# Patient Record
Sex: FEMALE | Race: BLACK OR AFRICAN AMERICAN | Employment: OTHER | URBAN - METROPOLITAN AREA
[De-identification: names, ages, dates, MRNs, and addresses within clinical notes are randomized per-mention and may not be internally consistent; named-entity substitution may affect disease eponyms.]

---

## 2017-02-22 ENCOUNTER — OFFICE VISIT (OUTPATIENT)
Dept: FAMILY MEDICINE CLINIC | Age: 62
End: 2017-02-22

## 2017-02-22 VITALS
SYSTOLIC BLOOD PRESSURE: 116 MMHG | BODY MASS INDEX: 19.86 KG/M2 | WEIGHT: 123.6 LBS | HEIGHT: 66 IN | DIASTOLIC BLOOD PRESSURE: 70 MMHG | HEART RATE: 76 BPM

## 2017-02-22 DIAGNOSIS — I10 ESSENTIAL HYPERTENSION: ICD-10-CM

## 2017-02-22 PROCEDURE — 99213 OFFICE O/P EST LOW 20 MIN: CPT | Performed by: FAMILY MEDICINE

## 2017-02-22 RX ORDER — SPIRONOLACTONE 100 MG/1
100 TABLET, FILM COATED ORAL DAILY
Qty: 90 TABLET | Refills: 1 | Status: SHIPPED | OUTPATIENT
Start: 2017-02-22 | End: 2017-08-16 | Stop reason: ALTCHOICE

## 2017-02-22 RX ORDER — VALSARTAN 160 MG/1
160 TABLET ORAL DAILY
Qty: 90 TABLET | Refills: 1 | Status: SHIPPED | OUTPATIENT
Start: 2017-02-22 | End: 2017-09-26 | Stop reason: SDUPTHER

## 2017-02-22 RX ORDER — ATENOLOL 100 MG/1
100 TABLET ORAL DAILY
Qty: 90 TABLET | Refills: 1 | Status: SHIPPED | OUTPATIENT
Start: 2017-02-22 | End: 2017-07-26 | Stop reason: SDUPTHER

## 2017-02-22 ASSESSMENT — ENCOUNTER SYMPTOMS: SHORTNESS OF BREATH: 0

## 2017-07-26 ENCOUNTER — TELEPHONE (OUTPATIENT)
Dept: FAMILY MEDICINE CLINIC | Age: 62
End: 2017-07-26

## 2017-07-26 DIAGNOSIS — I10 ESSENTIAL HYPERTENSION: ICD-10-CM

## 2017-07-26 RX ORDER — ATENOLOL 100 MG/1
100 TABLET ORAL DAILY
Qty: 30 TABLET | Refills: 1 | Status: SHIPPED | OUTPATIENT
Start: 2017-07-26 | End: 2017-09-26 | Stop reason: SDUPTHER

## 2017-07-27 DIAGNOSIS — R32 URINARY INCONTINENCE, UNSPECIFIED TYPE: ICD-10-CM

## 2017-07-27 RX ORDER — SOLIFENACIN SUCCINATE 5 MG/1
TABLET, FILM COATED ORAL
Qty: 90 TABLET | Refills: 2 | Status: SHIPPED | OUTPATIENT
Start: 2017-07-27 | End: 2018-03-03

## 2017-08-16 ENCOUNTER — OFFICE VISIT (OUTPATIENT)
Dept: FAMILY MEDICINE CLINIC | Age: 62
End: 2017-08-16

## 2017-08-16 VITALS
WEIGHT: 114.8 LBS | BODY MASS INDEX: 18.45 KG/M2 | HEART RATE: 58 BPM | DIASTOLIC BLOOD PRESSURE: 70 MMHG | SYSTOLIC BLOOD PRESSURE: 104 MMHG | HEIGHT: 66 IN

## 2017-08-16 DIAGNOSIS — Z23 NEEDS FLU SHOT: ICD-10-CM

## 2017-08-16 DIAGNOSIS — K63.5 POLYP OF COLON, UNSPECIFIED PART OF COLON, UNSPECIFIED TYPE: ICD-10-CM

## 2017-08-16 DIAGNOSIS — Z12.31 ENCOUNTER FOR SCREENING MAMMOGRAM FOR MALIGNANT NEOPLASM OF BREAST: ICD-10-CM

## 2017-08-16 DIAGNOSIS — I10 ESSENTIAL HYPERTENSION: Primary | ICD-10-CM

## 2017-08-16 DIAGNOSIS — R63.4 UNEXPLAINED WEIGHT LOSS: ICD-10-CM

## 2017-08-16 DIAGNOSIS — Z87.898 HISTORY OF IMPAIRED GLUCOSE TOLERANCE: ICD-10-CM

## 2017-08-16 DIAGNOSIS — E04.9 GOITER: ICD-10-CM

## 2017-08-16 LAB
A/G RATIO: 1.6 (ref 1.1–2.2)
ALBUMIN SERPL-MCNC: 4.4 G/DL (ref 3.4–5)
ALP BLD-CCNC: 41 U/L (ref 40–129)
ALT SERPL-CCNC: 7 U/L (ref 10–40)
ANION GAP SERPL CALCULATED.3IONS-SCNC: 11 MMOL/L (ref 3–16)
AST SERPL-CCNC: 11 U/L (ref 15–37)
BASOPHILS ABSOLUTE: 0 K/UL (ref 0–0.2)
BASOPHILS RELATIVE PERCENT: 0.5 %
BILIRUB SERPL-MCNC: <0.2 MG/DL (ref 0–1)
BUN BLDV-MCNC: 16 MG/DL (ref 7–20)
CALCIUM SERPL-MCNC: 10.2 MG/DL (ref 8.3–10.6)
CHLORIDE BLD-SCNC: 96 MMOL/L (ref 99–110)
CO2: 25 MMOL/L (ref 21–32)
CREAT SERPL-MCNC: 0.9 MG/DL (ref 0.6–1.2)
EOSINOPHILS ABSOLUTE: 0.3 K/UL (ref 0–0.6)
EOSINOPHILS RELATIVE PERCENT: 4.7 %
GFR AFRICAN AMERICAN: >60
GFR NON-AFRICAN AMERICAN: >60
GLOBULIN: 2.8 G/DL
GLUCOSE BLD-MCNC: 96 MG/DL (ref 70–99)
HCT VFR BLD CALC: 37.5 % (ref 36–48)
HEMOGLOBIN: 12.5 G/DL (ref 12–16)
LYMPHOCYTES ABSOLUTE: 1.9 K/UL (ref 1–5.1)
LYMPHOCYTES RELATIVE PERCENT: 27.6 %
MCH RBC QN AUTO: 33 PG (ref 26–34)
MCHC RBC AUTO-ENTMCNC: 33.5 G/DL (ref 31–36)
MCV RBC AUTO: 98.5 FL (ref 80–100)
MONOCYTES ABSOLUTE: 0.4 K/UL (ref 0–1.3)
MONOCYTES RELATIVE PERCENT: 5.8 %
NEUTROPHILS ABSOLUTE: 4.2 K/UL (ref 1.7–7.7)
NEUTROPHILS RELATIVE PERCENT: 61.4 %
PDW BLD-RTO: 13.5 % (ref 12.4–15.4)
PLATELET # BLD: 312 K/UL (ref 135–450)
PMV BLD AUTO: 8.9 FL (ref 5–10.5)
POTASSIUM SERPL-SCNC: 4.9 MMOL/L (ref 3.5–5.1)
RBC # BLD: 3.8 M/UL (ref 4–5.2)
SODIUM BLD-SCNC: 132 MMOL/L (ref 136–145)
TOTAL PROTEIN: 7.2 G/DL (ref 6.4–8.2)
TSH SERPL DL<=0.05 MIU/L-ACNC: 1.42 UIU/ML (ref 0.27–4.2)
WBC # BLD: 6.8 K/UL (ref 4–11)

## 2017-08-16 PROCEDURE — G0008 ADMIN INFLUENZA VIRUS VAC: HCPCS | Performed by: FAMILY MEDICINE

## 2017-08-16 PROCEDURE — 99214 OFFICE O/P EST MOD 30 MIN: CPT | Performed by: FAMILY MEDICINE

## 2017-08-16 PROCEDURE — 90686 IIV4 VACC NO PRSV 0.5 ML IM: CPT | Performed by: FAMILY MEDICINE

## 2017-08-16 PROCEDURE — 36415 COLL VENOUS BLD VENIPUNCTURE: CPT | Performed by: FAMILY MEDICINE

## 2017-08-16 ASSESSMENT — ENCOUNTER SYMPTOMS: SHORTNESS OF BREATH: 0

## 2017-08-18 DIAGNOSIS — E87.1 LOW SODIUM LEVELS: Primary | ICD-10-CM

## 2017-09-26 ENCOUNTER — TELEPHONE (OUTPATIENT)
Dept: FAMILY MEDICINE CLINIC | Age: 62
End: 2017-09-26

## 2017-09-26 DIAGNOSIS — I10 ESSENTIAL HYPERTENSION: ICD-10-CM

## 2017-09-26 DIAGNOSIS — I10 ESSENTIAL HYPERTENSION: Primary | ICD-10-CM

## 2017-09-26 RX ORDER — ATENOLOL 100 MG/1
100 TABLET ORAL DAILY
Qty: 90 TABLET | Refills: 1 | Status: SHIPPED | OUTPATIENT
Start: 2017-09-26 | End: 2018-02-22 | Stop reason: SDUPTHER

## 2017-09-26 RX ORDER — SPIRONOLACTONE 50 MG/1
50 TABLET, FILM COATED ORAL DAILY
Qty: 30 TABLET | Refills: 0 | Status: SHIPPED | OUTPATIENT
Start: 2017-09-26 | End: 2017-10-02 | Stop reason: DRUGHIGH

## 2017-09-26 RX ORDER — VALSARTAN 160 MG/1
160 TABLET ORAL DAILY
Qty: 90 TABLET | Refills: 1 | Status: SHIPPED | OUTPATIENT
Start: 2017-09-26 | End: 2018-02-22 | Stop reason: SDUPTHER

## 2017-09-26 RX ORDER — ATENOLOL 100 MG/1
100 TABLET ORAL DAILY
Qty: 30 TABLET | Refills: 0 | Status: SHIPPED | OUTPATIENT
Start: 2017-09-26 | End: 2018-02-07

## 2017-09-28 ENCOUNTER — TELEPHONE (OUTPATIENT)
Dept: FAMILY MEDICINE CLINIC | Age: 62
End: 2017-09-28

## 2017-10-02 ENCOUNTER — OFFICE VISIT (OUTPATIENT)
Dept: FAMILY MEDICINE CLINIC | Age: 62
End: 2017-10-02

## 2017-10-02 VITALS
HEIGHT: 66 IN | SYSTOLIC BLOOD PRESSURE: 110 MMHG | DIASTOLIC BLOOD PRESSURE: 70 MMHG | WEIGHT: 113.6 LBS | HEART RATE: 73 BPM | BODY MASS INDEX: 18.26 KG/M2

## 2017-10-02 DIAGNOSIS — I10 ESSENTIAL HYPERTENSION: Primary | ICD-10-CM

## 2017-10-02 PROCEDURE — 99213 OFFICE O/P EST LOW 20 MIN: CPT | Performed by: FAMILY MEDICINE

## 2017-10-02 RX ORDER — SUCRALFATE 1 G/10ML
10 SUSPENSION ORAL 2 TIMES DAILY
Refills: 1 | COMMUNITY
Start: 2017-08-29 | End: 2018-03-03

## 2017-10-02 RX ORDER — PANTOPRAZOLE SODIUM 40 MG/1
1 TABLET, DELAYED RELEASE ORAL DAILY
Refills: 3 | COMMUNITY
Start: 2017-09-29 | End: 2018-03-03

## 2017-10-02 RX ORDER — SPIRONOLACTONE 100 MG/1
100 TABLET, FILM COATED ORAL DAILY
Qty: 90 TABLET | Refills: 3 | Status: SHIPPED | OUTPATIENT
Start: 2017-10-02 | End: 2018-10-29 | Stop reason: SDUPTHER

## 2017-10-02 RX ORDER — DEXLANSOPRAZOLE 60 MG/1
1 CAPSULE, DELAYED RELEASE ORAL DAILY
Refills: 1 | COMMUNITY
Start: 2017-08-29 | End: 2018-02-07

## 2017-10-02 ASSESSMENT — ENCOUNTER SYMPTOMS: SHORTNESS OF BREATH: 0

## 2017-10-02 NOTE — PROGRESS NOTES
Subjective:      Patient ID: Kb Jean is a 57 y.o. female. Hypertension   This is a chronic problem. The current episode started more than 1 year ago. The problem is controlled. Pertinent negatives include no palpitations or shortness of breath. Risk factors for coronary artery disease include post-menopausal state. Past treatments include beta blockers, angiotensin blockers and diuretics. There are no compliance problems. Review of Systems   Constitutional: Positive for unexpected weight change. Respiratory: Negative for shortness of breath. Cardiovascular: Negative for palpitations. Objective:   Physical Exam   Constitutional: She appears well-developed and well-nourished. HENT:   Head: Normocephalic and atraumatic. Neck: Neck supple. Cardiovascular: Normal rate, regular rhythm and normal heart sounds. Pulmonary/Chest: Effort normal and breath sounds normal. No respiratory distress. She has no wheezes. Neurological: She is alert. Psychiatric: She has a normal mood and affect. Vitals:    10/02/17 1506   BP: 110/70   Site: Right Arm   Position: Sitting   Cuff Size: Medium Adult   Pulse: 73   Weight: 113 lb 9.6 oz (51.5 kg)   Height: 5' 6\" (1.676 m)     Body mass index is 18.34 kg/(m^2). Wt Readings from Last 3 Encounters:   10/02/17 113 lb 9.6 oz (51.5 kg)   08/16/17 114 lb 12.8 oz (52.1 kg)   02/22/17 123 lb 9.6 oz (56.1 kg)     BP Readings from Last 3 Encounters:   10/02/17 110/70   08/16/17 104/70   02/22/17 116/70        Assessment:      1.  Essential hypertension  spironolactone (ALDACTONE) 100 MG tablet           Plan:      HTN stable        GI is working up the weight

## 2018-01-19 PROBLEM — K29.00 ACUTE SUPERFICIAL GASTRITIS WITHOUT HEMORRHAGE: Status: ACTIVE | Noted: 2018-01-19

## 2018-02-07 ENCOUNTER — OFFICE VISIT (OUTPATIENT)
Dept: FAMILY MEDICINE CLINIC | Age: 63
End: 2018-02-07

## 2018-02-07 VITALS
RESPIRATION RATE: 15 BRPM | HEART RATE: 62 BPM | DIASTOLIC BLOOD PRESSURE: 60 MMHG | WEIGHT: 123 LBS | SYSTOLIC BLOOD PRESSURE: 95 MMHG | BODY MASS INDEX: 19.85 KG/M2

## 2018-02-07 DIAGNOSIS — I10 ESSENTIAL HYPERTENSION: Primary | ICD-10-CM

## 2018-02-07 PROCEDURE — 99213 OFFICE O/P EST LOW 20 MIN: CPT | Performed by: FAMILY MEDICINE

## 2018-02-07 PROCEDURE — G8420 CALC BMI NORM PARAMETERS: HCPCS | Performed by: FAMILY MEDICINE

## 2018-02-07 PROCEDURE — G8427 DOCREV CUR MEDS BY ELIG CLIN: HCPCS | Performed by: FAMILY MEDICINE

## 2018-02-07 PROCEDURE — 4004F PT TOBACCO SCREEN RCVD TLK: CPT | Performed by: FAMILY MEDICINE

## 2018-02-07 PROCEDURE — 3017F COLORECTAL CA SCREEN DOC REV: CPT | Performed by: FAMILY MEDICINE

## 2018-02-07 PROCEDURE — G8484 FLU IMMUNIZE NO ADMIN: HCPCS | Performed by: FAMILY MEDICINE

## 2018-02-07 PROCEDURE — 3014F SCREEN MAMMO DOC REV: CPT | Performed by: FAMILY MEDICINE

## 2018-02-07 ASSESSMENT — PATIENT HEALTH QUESTIONNAIRE - PHQ9
SUM OF ALL RESPONSES TO PHQ QUESTIONS 1-9: 1
SUM OF ALL RESPONSES TO PHQ9 QUESTIONS 1 & 2: 1
1. LITTLE INTEREST OR PLEASURE IN DOING THINGS: 1
2. FEELING DOWN, DEPRESSED OR HOPELESS: 0

## 2018-02-08 ASSESSMENT — ENCOUNTER SYMPTOMS: SHORTNESS OF BREATH: 0

## 2018-02-08 NOTE — PROGRESS NOTES
Patient ID: Josephine APPIAH 1955      Hypertension   This is a chronic problem. The current episode started more than 1 year ago. The problem is controlled. Pertinent negatives include no palpitations or shortness of breath. Risk factors for coronary artery disease include post-menopausal state. Past treatments include beta blockers, angiotensin blockers and diuretics. There are no compliance problems. Review of Systems   Respiratory: Negative for shortness of breath. Cardiovascular: Negative for palpitations. Neurological: Positive for dizziness.        Patient Active Problem List   Diagnosis    Goiter    Hearing loss    Impaired glucose tolerance    Hyperlipidemia    GERD (gastroesophageal reflux disease)    Essential hypertension    High serum renin    Serum calcium elevated    Colon polyp    Acute superficial gastritis without hemorrhage       Past Medical History:   Diagnosis Date    Colon polyp 05/2012    Dry eyes 3/2014    Dr. Dominik Taylors loss     [facility]    Hyperlipidemia 2002    Hypertension 2001    Impaired glucose tolerance 1/2013    Thyroid nodule     for bx 3/17/2016       Past Surgical History:   Procedure Laterality Date    BREAST BIOPSY      COLONOSCOPY  2012    polyp ([address])    COLONOSCOPY  09/05/2017    repeat 5 years    HYSTERECTOMY  1990's    complete (fibroids)    THYROID SURGERY      nodule    UPPER GASTROINTESTINAL ENDOSCOPY  08/29/2017    ulcer, non bleeding    UPPER GASTROINTESTINAL ENDOSCOPY  12/06/2017       Family History   Problem Relation Age of Onset    Diabetes Sister      type 2    Breast Cancer Sister     Lung Cancer Brother      Laryngeal       Current Outpatient Prescriptions on File Prior to Visit   Medication Sig Dispense Refill    CARAFATE 1 GM/10ML suspension Take 10 mLs by mouth 2 times daily  1    spironolactone (ALDACTONE) 100 MG tablet Take 1 tablet by mouth daily 90 tablet 3    valsartan

## 2018-02-12 ENCOUNTER — TELEPHONE (OUTPATIENT)
Dept: FAMILY MEDICINE CLINIC | Age: 63
End: 2018-02-12

## 2018-02-22 ENCOUNTER — OFFICE VISIT (OUTPATIENT)
Dept: FAMILY MEDICINE CLINIC | Age: 63
End: 2018-02-22

## 2018-02-22 VITALS
WEIGHT: 122 LBS | BODY MASS INDEX: 19.61 KG/M2 | HEART RATE: 88 BPM | HEIGHT: 66 IN | DIASTOLIC BLOOD PRESSURE: 74 MMHG | SYSTOLIC BLOOD PRESSURE: 130 MMHG

## 2018-02-22 DIAGNOSIS — I10 ESSENTIAL HYPERTENSION: Primary | ICD-10-CM

## 2018-02-22 DIAGNOSIS — Z12.39 SCREENING FOR BREAST CANCER: ICD-10-CM

## 2018-02-22 PROCEDURE — 3017F COLORECTAL CA SCREEN DOC REV: CPT | Performed by: FAMILY MEDICINE

## 2018-02-22 PROCEDURE — G8427 DOCREV CUR MEDS BY ELIG CLIN: HCPCS | Performed by: FAMILY MEDICINE

## 2018-02-22 PROCEDURE — G8420 CALC BMI NORM PARAMETERS: HCPCS | Performed by: FAMILY MEDICINE

## 2018-02-22 PROCEDURE — 3014F SCREEN MAMMO DOC REV: CPT | Performed by: FAMILY MEDICINE

## 2018-02-22 PROCEDURE — 99213 OFFICE O/P EST LOW 20 MIN: CPT | Performed by: FAMILY MEDICINE

## 2018-02-22 PROCEDURE — G8484 FLU IMMUNIZE NO ADMIN: HCPCS | Performed by: FAMILY MEDICINE

## 2018-02-22 PROCEDURE — 4004F PT TOBACCO SCREEN RCVD TLK: CPT | Performed by: FAMILY MEDICINE

## 2018-02-22 RX ORDER — VALSARTAN 160 MG/1
160 TABLET ORAL DAILY
Qty: 90 TABLET | Refills: 1 | Status: SHIPPED | OUTPATIENT
Start: 2018-02-22 | End: 2018-10-29

## 2018-02-22 RX ORDER — ATENOLOL 100 MG/1
100 TABLET ORAL DAILY
Qty: 90 TABLET | Refills: 1 | Status: SHIPPED | OUTPATIENT
Start: 2018-02-22 | End: 2018-10-29 | Stop reason: SDUPTHER

## 2018-02-22 RX ORDER — DIPHENHYDRAMINE HCL 25 MG
25 TABLET ORAL EVERY 6 HOURS PRN
COMMUNITY
End: 2018-03-03

## 2018-02-22 ASSESSMENT — ENCOUNTER SYMPTOMS: SHORTNESS OF BREATH: 0

## 2018-08-28 ENCOUNTER — TELEPHONE (OUTPATIENT)
Dept: FAMILY MEDICINE CLINIC | Age: 63
End: 2018-08-28

## 2018-08-28 DIAGNOSIS — I10 ESSENTIAL HYPERTENSION: Primary | ICD-10-CM

## 2018-08-28 RX ORDER — LOSARTAN POTASSIUM 50 MG/1
50 TABLET ORAL DAILY
Qty: 90 TABLET | Refills: 0 | Status: SHIPPED | OUTPATIENT
Start: 2018-08-28 | End: 2018-10-29 | Stop reason: SDUPTHER

## 2018-08-28 NOTE — TELEPHONE ENCOUNTER
Patient is in [place] visiting her sister Geronimo Officer. She needs replacement for the Valsartan that was recalled. Pharmacy is [facility].

## 2018-10-29 ENCOUNTER — OFFICE VISIT (OUTPATIENT)
Dept: FAMILY MEDICINE CLINIC | Age: 63
End: 2018-10-29
Payer: MEDICARE

## 2018-10-29 VITALS
HEART RATE: 77 BPM | BODY MASS INDEX: 18.48 KG/M2 | SYSTOLIC BLOOD PRESSURE: 118 MMHG | HEIGHT: 66 IN | WEIGHT: 115 LBS | DIASTOLIC BLOOD PRESSURE: 78 MMHG

## 2018-10-29 DIAGNOSIS — R11.0 NAUSEA: ICD-10-CM

## 2018-10-29 DIAGNOSIS — R63.4 UNINTENDED WEIGHT LOSS: ICD-10-CM

## 2018-10-29 DIAGNOSIS — Z12.31 ENCOUNTER FOR SCREENING MAMMOGRAM FOR MALIGNANT NEOPLASM OF BREAST: ICD-10-CM

## 2018-10-29 DIAGNOSIS — Z23 NEED FOR INFLUENZA VACCINATION: ICD-10-CM

## 2018-10-29 DIAGNOSIS — I10 ESSENTIAL HYPERTENSION: Primary | ICD-10-CM

## 2018-10-29 DIAGNOSIS — R32 URINARY INCONTINENCE, UNSPECIFIED TYPE: ICD-10-CM

## 2018-10-29 DIAGNOSIS — H91.90 HEARING LOSS, UNSPECIFIED HEARING LOSS TYPE, UNSPECIFIED LATERALITY: ICD-10-CM

## 2018-10-29 DIAGNOSIS — R63.0 LOSS OF APPETITE: ICD-10-CM

## 2018-10-29 DIAGNOSIS — Z78.0 POST-MENOPAUSAL: ICD-10-CM

## 2018-10-29 LAB
ANION GAP SERPL CALCULATED.3IONS-SCNC: 19 MMOL/L (ref 3–16)
BACTERIA: ABNORMAL /HPF
BILIRUBIN URINE: ABNORMAL
BLOOD, URINE: ABNORMAL
BUN BLDV-MCNC: 7 MG/DL (ref 7–20)
CALCIUM SERPL-MCNC: 10.3 MG/DL (ref 8.3–10.6)
CHLORIDE BLD-SCNC: 105 MMOL/L (ref 99–110)
CLARITY: ABNORMAL
CO2: 21 MMOL/L (ref 21–32)
COLOR: ABNORMAL
COMMENT UA: ABNORMAL
CREAT SERPL-MCNC: 0.7 MG/DL (ref 0.6–1.2)
CRYSTALS, UA: ABNORMAL /HPF
EPITHELIAL CELLS, UA: 5 /HPF (ref 0–5)
GFR AFRICAN AMERICAN: >60
GFR NON-AFRICAN AMERICAN: >60
GLUCOSE BLD-MCNC: 100 MG/DL (ref 70–99)
GLUCOSE URINE: NEGATIVE MG/DL
HCT VFR BLD CALC: 43.2 % (ref 36–48)
HEMOGLOBIN: 14.5 G/DL (ref 12–16)
KETONES, URINE: ABNORMAL MG/DL
LEUKOCYTE ESTERASE, URINE: NEGATIVE
MCH RBC QN AUTO: 34.1 PG (ref 26–34)
MCHC RBC AUTO-ENTMCNC: 33.5 G/DL (ref 31–36)
MCV RBC AUTO: 101.7 FL (ref 80–100)
MICROSCOPIC EXAMINATION: YES
NITRITE, URINE: NEGATIVE
PDW BLD-RTO: 14.1 % (ref 12.4–15.4)
PH UA: 6
PLATELET # BLD: 266 K/UL (ref 135–450)
PMV BLD AUTO: 9 FL (ref 5–10.5)
POTASSIUM SERPL-SCNC: 4.9 MMOL/L (ref 3.5–5.1)
PROTEIN UA: 30 MG/DL
RBC # BLD: 4.25 M/UL (ref 4–5.2)
RBC UA: 9 /HPF (ref 0–4)
SODIUM BLD-SCNC: 145 MMOL/L (ref 136–145)
SPECIFIC GRAVITY UA: 1.02
TSH SERPL DL<=0.05 MIU/L-ACNC: 0.86 UIU/ML (ref 0.27–4.2)
URINE TYPE: ABNORMAL
UROBILINOGEN, URINE: 0.2 E.U./DL
WBC # BLD: 6.1 K/UL (ref 4–11)
WBC UA: 2 /HPF (ref 0–5)

## 2018-10-29 PROCEDURE — 81003 URINALYSIS AUTO W/O SCOPE: CPT | Performed by: FAMILY MEDICINE

## 2018-10-29 PROCEDURE — 90662 IIV NO PRSV INCREASED AG IM: CPT | Performed by: FAMILY MEDICINE

## 2018-10-29 PROCEDURE — G8400 PT W/DXA NO RESULTS DOC: HCPCS | Performed by: FAMILY MEDICINE

## 2018-10-29 PROCEDURE — G0008 ADMIN INFLUENZA VIRUS VAC: HCPCS | Performed by: FAMILY MEDICINE

## 2018-10-29 PROCEDURE — 99214 OFFICE O/P EST MOD 30 MIN: CPT | Performed by: FAMILY MEDICINE

## 2018-10-29 PROCEDURE — G8427 DOCREV CUR MEDS BY ELIG CLIN: HCPCS | Performed by: FAMILY MEDICINE

## 2018-10-29 PROCEDURE — 1101F PT FALLS ASSESS-DOCD LE1/YR: CPT | Performed by: FAMILY MEDICINE

## 2018-10-29 PROCEDURE — 1123F ACP DISCUSS/DSCN MKR DOCD: CPT | Performed by: FAMILY MEDICINE

## 2018-10-29 PROCEDURE — 0509F URINE INCON PLAN DOCD: CPT | Performed by: FAMILY MEDICINE

## 2018-10-29 PROCEDURE — G8420 CALC BMI NORM PARAMETERS: HCPCS | Performed by: FAMILY MEDICINE

## 2018-10-29 PROCEDURE — 4004F PT TOBACCO SCREEN RCVD TLK: CPT | Performed by: FAMILY MEDICINE

## 2018-10-29 PROCEDURE — 36415 COLL VENOUS BLD VENIPUNCTURE: CPT | Performed by: FAMILY MEDICINE

## 2018-10-29 PROCEDURE — 1090F PRES/ABSN URINE INCON ASSESS: CPT | Performed by: FAMILY MEDICINE

## 2018-10-29 PROCEDURE — 3017F COLORECTAL CA SCREEN DOC REV: CPT | Performed by: FAMILY MEDICINE

## 2018-10-29 PROCEDURE — 4040F PNEUMOC VAC/ADMIN/RCVD: CPT | Performed by: FAMILY MEDICINE

## 2018-10-29 PROCEDURE — G8482 FLU IMMUNIZE ORDER/ADMIN: HCPCS | Performed by: FAMILY MEDICINE

## 2018-10-29 RX ORDER — LOSARTAN POTASSIUM 50 MG/1
50 TABLET ORAL DAILY
Qty: 90 TABLET | Refills: 1 | Status: SHIPPED | OUTPATIENT
Start: 2018-10-29 | End: 2018-12-26 | Stop reason: SDUPTHER

## 2018-10-29 RX ORDER — ATENOLOL 100 MG/1
100 TABLET ORAL DAILY
Qty: 90 TABLET | Refills: 1 | Status: SHIPPED | OUTPATIENT
Start: 2018-10-29 | End: 2019-04-12 | Stop reason: SDUPTHER

## 2018-10-29 RX ORDER — ONDANSETRON 4 MG/1
4 TABLET, FILM COATED ORAL 3 TIMES DAILY PRN
Qty: 40 TABLET | Refills: 1 | Status: SHIPPED | OUTPATIENT
Start: 2018-10-29 | End: 2018-11-12 | Stop reason: SDUPTHER

## 2018-10-29 RX ORDER — VALSARTAN 160 MG/1
160 TABLET ORAL DAILY
Qty: 90 TABLET | Refills: 1 | Status: SHIPPED | OUTPATIENT
Start: 2018-10-29 | End: 2018-10-29 | Stop reason: CLARIF

## 2018-10-29 RX ORDER — SPIRONOLACTONE 100 MG/1
100 TABLET, FILM COATED ORAL DAILY
Qty: 90 TABLET | Refills: 3 | Status: SHIPPED | OUTPATIENT
Start: 2018-10-29 | End: 2018-11-12 | Stop reason: ALTCHOICE

## 2018-10-29 RX ORDER — LOSARTAN POTASSIUM 50 MG/1
50 TABLET ORAL DAILY
Qty: 90 TABLET | Refills: 1 | Status: SHIPPED | OUTPATIENT
Start: 2018-10-29 | End: 2018-11-12

## 2018-10-29 RX ORDER — SOLIFENACIN SUCCINATE 5 MG/1
5 TABLET, FILM COATED ORAL DAILY
Qty: 90 TABLET | Refills: 1 | Status: SHIPPED | OUTPATIENT
Start: 2018-10-29 | End: 2019-05-01 | Stop reason: SDUPTHER

## 2018-10-29 RX ORDER — PANTOPRAZOLE SODIUM 40 MG/1
40 TABLET, DELAYED RELEASE ORAL DAILY
Qty: 30 TABLET | Refills: 0 | Status: SHIPPED | OUTPATIENT
Start: 2018-10-29 | End: 2018-11-12 | Stop reason: SDUPTHER

## 2018-10-29 ASSESSMENT — ENCOUNTER SYMPTOMS
VOMITING: 0
SHORTNESS OF BREATH: 0
NAUSEA: 1

## 2018-10-29 NOTE — PROGRESS NOTES
Patient ID: Sandra APPIAH 1955    . Hypertension   This is a chronic problem. The current episode started more than 1 year ago. The problem is controlled. Pertinent negatives include no palpitations or shortness of breath. Risk factors for coronary artery disease include post-menopausal state. Past treatments include beta blockers, angiotensin blockers and diuretics (preferred to take the 100mg Beta blocker because the half dose made her dizzy). There are no compliance problems. Nausea: on and off. Poor appetite. Gets full easily. Is  from her --was out of town for a while and now back. Symptoms have worsened since she is back because she is in the same home with her soon to be ex . Was on medication in the past that helped but can't recall the name. Review of Systems   Constitutional: Positive for unexpected weight change. Respiratory: Negative for shortness of breath. Cardiovascular: Negative for palpitations. Gastrointestinal: Positive for nausea. Negative for vomiting. Psychiatric/Behavioral: Positive for agitation and behavioral problems.        Patient Active Problem List   Diagnosis    Goiter    Hearing loss    Impaired glucose tolerance    Hyperlipidemia    GERD (gastroesophageal reflux disease)    Essential hypertension    High serum renin    Serum calcium elevated    Colon polyp    Acute superficial gastritis without hemorrhage       Past Medical History:   Diagnosis Date    Colon polyp 05/2012    Dry eyes 3/2014    Dr. Chin Puls loss     [place]    Hyperlipidemia 2002    Hypertension 2001    Impaired glucose tolerance 1/2013    Thyroid nodule     for bx 3/17/2016       Past Surgical History:   Procedure Laterality Date    BREAST BIOPSY      COLONOSCOPY  2012    polyp ([address])    COLONOSCOPY  09/05/2017    repeat 5 years    HYSTERECTOMY  1990's    complete (fibroids)    THYROID SURGERY tablet    pantoprazole (PROTONIX) 40 MG tablet   7. Hearing loss, unspecified hearing loss type, unspecified laterality     8. Encounter for screening mammogram for malignant neoplasm of breast  YAYO Screening Bilateral   9. Post-menopausal  DEXA BONE DENSITY AXIAL SKELETON           Plan:      HTN controlled continue with current medications. Diovan stopped and replaced with Losartan a few months ago due to national recall. Review of GI medications indicates that she was on Zofran in the past.  She agrees that this is the medication that helped her. Communication limited because patient hard of hearing and she is not always looking at me when I'm speaking. When she looks, she is able to read my lips. Has had extensive GI workup in the past, so not sure if additional work up is warranted.   She has already been referred t ENT for her hearing diffictulty

## 2018-10-30 LAB
CHOLESTEROL, TOTAL: 185 MG/DL (ref 0–199)
HDLC SERPL-MCNC: 47 MG/DL (ref 40–60)
LDL CHOLESTEROL CALCULATED: 118 MG/DL
TRIGL SERPL-MCNC: 99 MG/DL (ref 0–150)
VLDLC SERPL CALC-MCNC: 20 MG/DL

## 2018-10-31 ENCOUNTER — TELEPHONE (OUTPATIENT)
Dept: FAMILY MEDICINE CLINIC | Age: 63
End: 2018-10-31

## 2018-10-31 DIAGNOSIS — I10 ESSENTIAL HYPERTENSION: Primary | ICD-10-CM

## 2018-10-31 RX ORDER — ATENOLOL 100 MG/1
100 TABLET ORAL DAILY
Qty: 30 TABLET | Refills: 0 | Status: SHIPPED | OUTPATIENT
Start: 2018-10-31 | End: 2018-11-12

## 2018-11-08 ENCOUNTER — HOSPITAL ENCOUNTER (OUTPATIENT)
Dept: WOMENS IMAGING | Age: 63
Discharge: HOME | End: 2018-11-08
Payer: MEDICARE

## 2018-11-08 ENCOUNTER — TELEPHONE (OUTPATIENT)
Dept: FAMILY MEDICINE CLINIC | Age: 63
End: 2018-11-08

## 2018-11-08 DIAGNOSIS — Z78.0 POST-MENOPAUSAL: ICD-10-CM

## 2018-11-08 DIAGNOSIS — Z12.39 SCREENING FOR BREAST CANCER: ICD-10-CM

## 2018-11-08 DIAGNOSIS — I10 ESSENTIAL HYPERTENSION: Primary | ICD-10-CM

## 2018-11-08 PROCEDURE — 77067 SCR MAMMO BI INCL CAD: CPT

## 2018-11-08 PROCEDURE — 77080 DXA BONE DENSITY AXIAL: CPT

## 2018-11-08 RX ORDER — SPIRONOLACTONE 50 MG/1
100 TABLET, FILM COATED ORAL DAILY
Qty: 180 TABLET | Refills: 3 | Status: SHIPPED | OUTPATIENT
Start: 2018-11-08 | End: 2019-08-12 | Stop reason: SDUPTHER

## 2018-11-12 ENCOUNTER — OFFICE VISIT (OUTPATIENT)
Dept: FAMILY MEDICINE CLINIC | Age: 63
End: 2018-11-12
Payer: MEDICARE

## 2018-11-12 VITALS
HEIGHT: 66 IN | DIASTOLIC BLOOD PRESSURE: 70 MMHG | BODY MASS INDEX: 18.61 KG/M2 | SYSTOLIC BLOOD PRESSURE: 110 MMHG | HEART RATE: 71 BPM | WEIGHT: 115.8 LBS

## 2018-11-12 DIAGNOSIS — M81.0 AGE-RELATED OSTEOPOROSIS WITHOUT CURRENT PATHOLOGICAL FRACTURE: ICD-10-CM

## 2018-11-12 DIAGNOSIS — R63.0 LOSS OF APPETITE: ICD-10-CM

## 2018-11-12 DIAGNOSIS — R63.4 UNINTENDED WEIGHT LOSS: ICD-10-CM

## 2018-11-12 DIAGNOSIS — R11.0 NAUSEA: Primary | ICD-10-CM

## 2018-11-12 PROCEDURE — G8482 FLU IMMUNIZE ORDER/ADMIN: HCPCS | Performed by: FAMILY MEDICINE

## 2018-11-12 PROCEDURE — 1090F PRES/ABSN URINE INCON ASSESS: CPT | Performed by: FAMILY MEDICINE

## 2018-11-12 PROCEDURE — 4040F PNEUMOC VAC/ADMIN/RCVD: CPT | Performed by: FAMILY MEDICINE

## 2018-11-12 PROCEDURE — 1101F PT FALLS ASSESS-DOCD LE1/YR: CPT | Performed by: FAMILY MEDICINE

## 2018-11-12 PROCEDURE — G8420 CALC BMI NORM PARAMETERS: HCPCS | Performed by: FAMILY MEDICINE

## 2018-11-12 PROCEDURE — 1123F ACP DISCUSS/DSCN MKR DOCD: CPT | Performed by: FAMILY MEDICINE

## 2018-11-12 PROCEDURE — 3017F COLORECTAL CA SCREEN DOC REV: CPT | Performed by: FAMILY MEDICINE

## 2018-11-12 PROCEDURE — 4004F PT TOBACCO SCREEN RCVD TLK: CPT | Performed by: FAMILY MEDICINE

## 2018-11-12 PROCEDURE — G8427 DOCREV CUR MEDS BY ELIG CLIN: HCPCS | Performed by: FAMILY MEDICINE

## 2018-11-12 PROCEDURE — 99213 OFFICE O/P EST LOW 20 MIN: CPT | Performed by: FAMILY MEDICINE

## 2018-11-12 PROCEDURE — G8399 PT W/DXA RESULTS DOCUMENT: HCPCS | Performed by: FAMILY MEDICINE

## 2018-11-12 RX ORDER — ONDANSETRON 4 MG/1
4 TABLET, FILM COATED ORAL DAILY PRN
Qty: 90 TABLET | Refills: 1 | Status: SHIPPED | OUTPATIENT
Start: 2018-11-12 | End: 2019-08-12

## 2018-11-12 RX ORDER — PANTOPRAZOLE SODIUM 40 MG/1
40 TABLET, DELAYED RELEASE ORAL DAILY
Qty: 90 TABLET | Refills: 3 | Status: SHIPPED | OUTPATIENT
Start: 2018-11-12 | End: 2019-08-12

## 2018-11-12 NOTE — PROGRESS NOTES
Patient ID: Satya Madison 1955        HPI here for follow-up of nausea,  abdominal pain. Feels like she is doing a lot better with the current medication. She's taken the Protonix daily. Zofran she uses every once in a while. Is pleased with how she is feeling    Review of Systems   Constitutional: Negative for appetite change and unexpected weight change. Gastrointestinal: Positive for nausea. Negative for vomiting.        Patient Active Problem List   Diagnosis    Goiter    Hearing loss    Impaired glucose tolerance    Hyperlipidemia    GERD (gastroesophageal reflux disease)    Essential hypertension    High serum renin    Serum calcium elevated    Colon polyp    Acute superficial gastritis without hemorrhage       Past Medical History:   Diagnosis Date    Colon polyp 05/2012    Dry eyes 3/2014    Dr. Evangelista Estimable loss     [facility]    Hyperlipidemia 2002    Hypertension 2001    Impaired glucose tolerance 1/2013    Thyroid nodule     for bx 3/17/2016       Past Surgical History:   Procedure Laterality Date    BREAST BIOPSY      COLONOSCOPY  2012    polyp ([address])    COLONOSCOPY  09/05/2017    repeat 5 years    HYSTERECTOMY  1990's    complete (fibroids)    THYROID SURGERY      nodule    UPPER GASTROINTESTINAL ENDOSCOPY  08/29/2017    ulcer, non bleeding    UPPER GASTROINTESTINAL ENDOSCOPY  12/06/2017       Family History   Problem Relation Age of Onset    Diabetes Sister         type 2    Breast Cancer Sister     Lung Cancer Brother         Laryngeal       Current Outpatient Prescriptions on File Prior to Visit   Medication Sig Dispense Refill    spironolactone (ALDACTONE) 50 MG tablet Take 2 tablets by mouth daily 180 tablet 3    losartan (COZAAR) 50 MG tablet Take 1 tablet by mouth daily 90 tablet 1    atenolol (TENORMIN) 100 MG tablet Take 1 tablet by mouth daily 90 tablet 1    solifenacin (VESICARE) 5 MG tablet Take 1 tablet by mouth

## 2018-11-13 ENCOUNTER — PATIENT MESSAGE (OUTPATIENT)
Dept: FAMILY MEDICINE CLINIC | Age: 63
End: 2018-11-13

## 2018-11-13 PROBLEM — M81.0 AGE-RELATED OSTEOPOROSIS WITHOUT CURRENT PATHOLOGICAL FRACTURE: Status: ACTIVE | Noted: 2018-11-13

## 2018-11-13 ASSESSMENT — ENCOUNTER SYMPTOMS
VOMITING: 0
NAUSEA: 1

## 2018-11-13 NOTE — LETTER
[facility]  [address]  Phone: [phone]  Fax: [phone]    Rohit Price MD        November 28, 2018    [address]  [address] [place] [id number]      Dear Mikayla Emery: The office has been unable to contact you with the results through my chart. The results are as follows;I reviewed your bone density again.  You actually have osteoporosis which means that your bones are very thin.  More thin than what I told you.  I recommend that in addition to the Caltrate D that you take another medication to help to stop the thinning of the bones.  Is it OK to send this to your mail away pharmacy? Enclosed please find your My chart user ID and a my chart ID wallet card. Please record your user ID and password. If you have forgotten your password, please call the office at [phone] and we can set up a temporary one. Please don't forget that most test results  are released to  [employer] for viewing. If you have any questions or concerns, please don't hesitate to call.     Sincerely,        Rohit Price MD

## 2018-11-20 ENCOUNTER — TELEPHONE (OUTPATIENT)
Dept: FAMILY MEDICINE CLINIC | Age: 63
End: 2018-11-20

## 2018-11-20 DIAGNOSIS — I10 ESSENTIAL HYPERTENSION: Primary | ICD-10-CM

## 2018-11-20 RX ORDER — LOSARTAN POTASSIUM 50 MG/1
50 TABLET ORAL DAILY
Qty: 30 TABLET | Refills: 0 | Status: SHIPPED | OUTPATIENT
Start: 2018-11-20 | End: 2019-02-18

## 2018-11-20 NOTE — TELEPHONE ENCOUNTER
Patient is going out of town tomorrow morning. [facility]. Can ten days be called to the [facility] on [address]. Will call [facility] after to fill.

## 2018-12-26 DIAGNOSIS — I10 ESSENTIAL HYPERTENSION: ICD-10-CM

## 2018-12-26 RX ORDER — LOSARTAN POTASSIUM 50 MG/1
50 TABLET ORAL DAILY
Qty: 90 TABLET | Refills: 1 | Status: SHIPPED | OUTPATIENT
Start: 2018-12-26 | End: 2019-05-30 | Stop reason: SDUPTHER

## 2019-01-18 ENCOUNTER — TELEPHONE (OUTPATIENT)
Dept: FAMILY MEDICINE CLINIC | Age: 64
End: 2019-01-18

## 2019-02-18 ENCOUNTER — OFFICE VISIT (OUTPATIENT)
Dept: FAMILY MEDICINE CLINIC | Age: 64
End: 2019-02-18
Payer: MEDICARE

## 2019-02-18 VITALS
SYSTOLIC BLOOD PRESSURE: 160 MMHG | WEIGHT: 115.8 LBS | DIASTOLIC BLOOD PRESSURE: 90 MMHG | BODY MASS INDEX: 18.61 KG/M2 | HEIGHT: 66 IN | HEART RATE: 76 BPM

## 2019-02-18 DIAGNOSIS — R73.02 IMPAIRED GLUCOSE TOLERANCE: ICD-10-CM

## 2019-02-18 DIAGNOSIS — R62.7 FAILURE TO GAIN WEIGHT IN ADULT: Primary | ICD-10-CM

## 2019-02-18 DIAGNOSIS — R63.6 UNDERWEIGHT: ICD-10-CM

## 2019-02-18 DIAGNOSIS — I10 ESSENTIAL HYPERTENSION: ICD-10-CM

## 2019-02-18 DIAGNOSIS — E78.5 HYPERLIPIDEMIA, UNSPECIFIED HYPERLIPIDEMIA TYPE: ICD-10-CM

## 2019-02-18 DIAGNOSIS — I10 UNCONTROLLED HYPERTENSION: ICD-10-CM

## 2019-02-18 PROBLEM — K44.9 HIATAL HERNIA: Status: ACTIVE | Noted: 2019-02-18

## 2019-02-18 LAB
A/G RATIO: 1.6 (ref 1.1–2.2)
ALBUMIN SERPL-MCNC: 5.1 G/DL (ref 3.4–5)
ALP BLD-CCNC: 60 U/L (ref 40–129)
ALT SERPL-CCNC: 8 U/L (ref 10–40)
ANION GAP SERPL CALCULATED.3IONS-SCNC: 14 MMOL/L (ref 3–16)
AST SERPL-CCNC: 14 U/L (ref 15–37)
BASOPHILS ABSOLUTE: 0 K/UL (ref 0–0.2)
BASOPHILS RELATIVE PERCENT: 0.5 %
BILIRUB SERPL-MCNC: 0.4 MG/DL (ref 0–1)
BUN BLDV-MCNC: 9 MG/DL (ref 7–20)
CALCIUM SERPL-MCNC: 11.3 MG/DL (ref 8.3–10.6)
CHLORIDE BLD-SCNC: 96 MMOL/L (ref 99–110)
CO2: 26 MMOL/L (ref 21–32)
CREAT SERPL-MCNC: 0.8 MG/DL (ref 0.6–1.2)
EOSINOPHILS ABSOLUTE: 0.1 K/UL (ref 0–0.6)
EOSINOPHILS RELATIVE PERCENT: 1 %
FOLATE: >20 NG/ML (ref 4.78–24.2)
GFR AFRICAN AMERICAN: >60
GFR NON-AFRICAN AMERICAN: >60
GLOBULIN: 3.2 G/DL
GLUCOSE BLD-MCNC: 102 MG/DL (ref 70–99)
HCT VFR BLD CALC: 42.3 % (ref 36–48)
HEMOGLOBIN: 14.1 G/DL (ref 12–16)
LYMPHOCYTES ABSOLUTE: 1.7 K/UL (ref 1–5.1)
LYMPHOCYTES RELATIVE PERCENT: 28.6 %
MCH RBC QN AUTO: 33 PG (ref 26–34)
MCHC RBC AUTO-ENTMCNC: 33.4 G/DL (ref 31–36)
MCV RBC AUTO: 98.7 FL (ref 80–100)
MONOCYTES ABSOLUTE: 0.4 K/UL (ref 0–1.3)
MONOCYTES RELATIVE PERCENT: 7.5 %
NEUTROPHILS ABSOLUTE: 3.6 K/UL (ref 1.7–7.7)
NEUTROPHILS RELATIVE PERCENT: 62.4 %
PDW BLD-RTO: 14.3 % (ref 12.4–15.4)
PLATELET # BLD: 291 K/UL (ref 135–450)
PMV BLD AUTO: 9.5 FL (ref 5–10.5)
POTASSIUM SERPL-SCNC: 4.1 MMOL/L (ref 3.5–5.1)
RBC # BLD: 4.28 M/UL (ref 4–5.2)
SEDIMENTATION RATE, ERYTHROCYTE: 19 MM/HR (ref 0–30)
SODIUM BLD-SCNC: 136 MMOL/L (ref 136–145)
TOTAL PROTEIN: 8.3 G/DL (ref 6.4–8.2)
VITAMIN B-12: 615 PG/ML (ref 211–911)
WBC # BLD: 5.8 K/UL (ref 4–11)

## 2019-02-18 PROCEDURE — G8482 FLU IMMUNIZE ORDER/ADMIN: HCPCS | Performed by: FAMILY MEDICINE

## 2019-02-18 PROCEDURE — 1036F TOBACCO NON-USER: CPT | Performed by: FAMILY MEDICINE

## 2019-02-18 PROCEDURE — 99214 OFFICE O/P EST MOD 30 MIN: CPT | Performed by: FAMILY MEDICINE

## 2019-02-18 PROCEDURE — 4040F PNEUMOC VAC/ADMIN/RCVD: CPT | Performed by: FAMILY MEDICINE

## 2019-02-18 PROCEDURE — 3017F COLORECTAL CA SCREEN DOC REV: CPT | Performed by: FAMILY MEDICINE

## 2019-02-18 PROCEDURE — 1123F ACP DISCUSS/DSCN MKR DOCD: CPT | Performed by: FAMILY MEDICINE

## 2019-02-18 PROCEDURE — G8399 PT W/DXA RESULTS DOCUMENT: HCPCS | Performed by: FAMILY MEDICINE

## 2019-02-18 PROCEDURE — 1090F PRES/ABSN URINE INCON ASSESS: CPT | Performed by: FAMILY MEDICINE

## 2019-02-18 PROCEDURE — 36415 COLL VENOUS BLD VENIPUNCTURE: CPT | Performed by: FAMILY MEDICINE

## 2019-02-18 PROCEDURE — G8427 DOCREV CUR MEDS BY ELIG CLIN: HCPCS | Performed by: FAMILY MEDICINE

## 2019-02-18 PROCEDURE — 1101F PT FALLS ASSESS-DOCD LE1/YR: CPT | Performed by: FAMILY MEDICINE

## 2019-02-18 PROCEDURE — G8420 CALC BMI NORM PARAMETERS: HCPCS | Performed by: FAMILY MEDICINE

## 2019-02-18 ASSESSMENT — PATIENT HEALTH QUESTIONNAIRE - PHQ9
SUM OF ALL RESPONSES TO PHQ QUESTIONS 1-9: 2
2. FEELING DOWN, DEPRESSED OR HOPELESS: 1
SUM OF ALL RESPONSES TO PHQ9 QUESTIONS 1 & 2: 2
SUM OF ALL RESPONSES TO PHQ QUESTIONS 1-9: 2
1. LITTLE INTEREST OR PLEASURE IN DOING THINGS: 1

## 2019-02-18 ASSESSMENT — ENCOUNTER SYMPTOMS
CONSTIPATION: 0
ABDOMINAL PAIN: 0
BLOOD IN STOOL: 0

## 2019-02-19 LAB
ESTIMATED AVERAGE GLUCOSE: 111.2 MG/DL
HBA1C MFR BLD: 5.5 %

## 2019-04-12 ENCOUNTER — OFFICE VISIT (OUTPATIENT)
Dept: FAMILY MEDICINE CLINIC | Age: 64
End: 2019-04-12
Payer: MEDICARE

## 2019-04-12 VITALS
HEART RATE: 84 BPM | HEIGHT: 66 IN | BODY MASS INDEX: 19.06 KG/M2 | DIASTOLIC BLOOD PRESSURE: 88 MMHG | WEIGHT: 118.6 LBS | SYSTOLIC BLOOD PRESSURE: 120 MMHG

## 2019-04-12 DIAGNOSIS — E83.52 SERUM CALCIUM ELEVATED: ICD-10-CM

## 2019-04-12 DIAGNOSIS — I10 ESSENTIAL HYPERTENSION: Primary | ICD-10-CM

## 2019-04-12 PROCEDURE — G8399 PT W/DXA RESULTS DOCUMENT: HCPCS | Performed by: FAMILY MEDICINE

## 2019-04-12 PROCEDURE — 4004F PT TOBACCO SCREEN RCVD TLK: CPT | Performed by: FAMILY MEDICINE

## 2019-04-12 PROCEDURE — G8427 DOCREV CUR MEDS BY ELIG CLIN: HCPCS | Performed by: FAMILY MEDICINE

## 2019-04-12 PROCEDURE — 3017F COLORECTAL CA SCREEN DOC REV: CPT | Performed by: FAMILY MEDICINE

## 2019-04-12 PROCEDURE — 1123F ACP DISCUSS/DSCN MKR DOCD: CPT | Performed by: FAMILY MEDICINE

## 2019-04-12 PROCEDURE — 4040F PNEUMOC VAC/ADMIN/RCVD: CPT | Performed by: FAMILY MEDICINE

## 2019-04-12 PROCEDURE — 1090F PRES/ABSN URINE INCON ASSESS: CPT | Performed by: FAMILY MEDICINE

## 2019-04-12 PROCEDURE — 99213 OFFICE O/P EST LOW 20 MIN: CPT | Performed by: FAMILY MEDICINE

## 2019-04-12 PROCEDURE — G8420 CALC BMI NORM PARAMETERS: HCPCS | Performed by: FAMILY MEDICINE

## 2019-04-12 RX ORDER — ATENOLOL 100 MG/1
100 TABLET ORAL DAILY
Qty: 90 TABLET | Refills: 1 | Status: SHIPPED | OUTPATIENT
Start: 2019-04-12 | End: 2019-08-12 | Stop reason: SDUPTHER

## 2019-04-12 ASSESSMENT — ENCOUNTER SYMPTOMS: SHORTNESS OF BREATH: 0

## 2019-04-12 NOTE — PROGRESS NOTES
Refill    losartan (COZAAR) 50 MG tablet Take 1 tablet by mouth daily 90 tablet 1    Calcium Carb-Cholecalciferol (CALTRATE 600+D) 600-800 MG-UNIT TABS Take twice per day 180 tablet 3    spironolactone (ALDACTONE) 50 MG tablet Take 2 tablets by mouth daily 180 tablet 3    aspirin 81 MG chewable tablet Take 81 mg by mouth daily.  Probiotic Product (PROBIOTIC DAILY PO) Take 1 tablet by mouth daily      pantoprazole (PROTONIX) 40 MG tablet Take 1 tablet by mouth daily 90 tablet 3    ondansetron (ZOFRAN) 4 MG tablet Take 1 tablet by mouth daily as needed for Nausea or Vomiting 90 tablet 1    solifenacin (VESICARE) 5 MG tablet Take 1 tablet by mouth daily 90 tablet 1    [DISCONTINUED] solifenacin (VESICARE) 5 MG tablet Take 1 tablet by mouth daily. 90 tablet 1     No current facility-administered medications on file prior to visit. Objective:     Physical Exam   Constitutional: She appears well-developed and well-nourished. HENT:   Head: Normocephalic and atraumatic. Right Ear: Decreased hearing is noted. Left Ear: Decreased hearing is noted. Neck: Neck supple. Cardiovascular: Normal rate, regular rhythm, S1 normal, S2 normal and normal heart sounds. Pulmonary/Chest: Effort normal and breath sounds normal. No respiratory distress. She has no wheezes. Neurological: She is alert. Skin: Skin is warm, dry and intact. Psychiatric: She has a normal mood and affect. Nursing note and vitals reviewed. Vitals:    04/12/19 1003   BP: 120/88   Site: Left Upper Arm   Position: Sitting   Cuff Size: Medium Adult   Pulse: 84   Weight: 118 lb 9.6 oz (53.8 kg)   Height: 5' 6\" (1.676 m)     Body mass index is 19.14 kg/m².      Wt Readings from Last 3 Encounters:   04/12/19 118 lb 9.6 oz (53.8 kg)   02/18/19 115 lb 12.8 oz (52.5 kg)   11/12/18 115 lb 12.8 oz (52.5 kg)     BP Readings from Last 3 Encounters:   04/12/19 120/88   02/18/19 (!) 160/90   11/12/18 110/70          No results found for this visit on 04/12/19. Assessment:       Diagnosis Orders   1. Essential hypertension  atenolol (TENORMIN) 100 MG tablet   2. Serum calcium elevated             Plan:      Explained that patient needs to stop taking her calcium supplement. Her serum calcium level was elevated previously. I tried to get a repeat blood test today, but she loudly protested. She does not want to be stuck again today. We'll recheck next time.

## 2019-05-01 DIAGNOSIS — R32 URINARY INCONTINENCE, UNSPECIFIED TYPE: ICD-10-CM

## 2019-05-01 RX ORDER — SOLIFENACIN SUCCINATE 5 MG/1
5 TABLET, FILM COATED ORAL DAILY
Qty: 90 TABLET | Refills: 1 | Status: SHIPPED | OUTPATIENT
Start: 2019-05-01 | End: 2019-08-12 | Stop reason: SDUPTHER

## 2019-05-30 DIAGNOSIS — I10 ESSENTIAL HYPERTENSION: ICD-10-CM

## 2019-05-30 RX ORDER — LOSARTAN POTASSIUM 50 MG/1
50 TABLET ORAL DAILY
Qty: 90 TABLET | Refills: 0 | Status: SHIPPED | OUTPATIENT
Start: 2019-05-30 | End: 2019-08-12 | Stop reason: SDUPTHER

## 2019-08-12 ENCOUNTER — OFFICE VISIT (OUTPATIENT)
Dept: FAMILY MEDICINE CLINIC | Age: 64
End: 2019-08-12
Payer: MEDICARE

## 2019-08-12 VITALS
WEIGHT: 127 LBS | BODY MASS INDEX: 20.41 KG/M2 | DIASTOLIC BLOOD PRESSURE: 68 MMHG | SYSTOLIC BLOOD PRESSURE: 110 MMHG | HEIGHT: 66 IN | HEART RATE: 66 BPM

## 2019-08-12 DIAGNOSIS — I10 ESSENTIAL HYPERTENSION: Primary | ICD-10-CM

## 2019-08-12 DIAGNOSIS — R32 URINARY INCONTINENCE, UNSPECIFIED TYPE: ICD-10-CM

## 2019-08-12 DIAGNOSIS — Z12.39 SCREENING BREAST EXAMINATION: ICD-10-CM

## 2019-08-12 DIAGNOSIS — E83.52 SERUM CALCIUM ELEVATED: ICD-10-CM

## 2019-08-12 DIAGNOSIS — K21.9 GASTROESOPHAGEAL REFLUX DISEASE WITHOUT ESOPHAGITIS: ICD-10-CM

## 2019-08-12 DIAGNOSIS — M81.0 AGE-RELATED OSTEOPOROSIS WITHOUT CURRENT PATHOLOGICAL FRACTURE: ICD-10-CM

## 2019-08-12 DIAGNOSIS — R73.02 IMPAIRED GLUCOSE TOLERANCE: ICD-10-CM

## 2019-08-12 DIAGNOSIS — E78.5 HYPERLIPIDEMIA, UNSPECIFIED HYPERLIPIDEMIA TYPE: ICD-10-CM

## 2019-08-12 PROCEDURE — 1090F PRES/ABSN URINE INCON ASSESS: CPT | Performed by: FAMILY MEDICINE

## 2019-08-12 PROCEDURE — 4004F PT TOBACCO SCREEN RCVD TLK: CPT | Performed by: FAMILY MEDICINE

## 2019-08-12 PROCEDURE — G8420 CALC BMI NORM PARAMETERS: HCPCS | Performed by: FAMILY MEDICINE

## 2019-08-12 PROCEDURE — 1123F ACP DISCUSS/DSCN MKR DOCD: CPT | Performed by: FAMILY MEDICINE

## 2019-08-12 PROCEDURE — G8427 DOCREV CUR MEDS BY ELIG CLIN: HCPCS | Performed by: FAMILY MEDICINE

## 2019-08-12 PROCEDURE — G8399 PT W/DXA RESULTS DOCUMENT: HCPCS | Performed by: FAMILY MEDICINE

## 2019-08-12 PROCEDURE — 99213 OFFICE O/P EST LOW 20 MIN: CPT | Performed by: FAMILY MEDICINE

## 2019-08-12 PROCEDURE — 4040F PNEUMOC VAC/ADMIN/RCVD: CPT | Performed by: FAMILY MEDICINE

## 2019-08-12 PROCEDURE — 3017F COLORECTAL CA SCREEN DOC REV: CPT | Performed by: FAMILY MEDICINE

## 2019-08-12 PROCEDURE — 0509F URINE INCON PLAN DOCD: CPT | Performed by: FAMILY MEDICINE

## 2019-08-12 RX ORDER — SOLIFENACIN SUCCINATE 5 MG/1
5 TABLET, FILM COATED ORAL DAILY
Qty: 90 TABLET | Refills: 1 | Status: SHIPPED | OUTPATIENT
Start: 2019-08-12 | End: 2020-02-03 | Stop reason: SDUPTHER

## 2019-08-12 RX ORDER — ALENDRONATE SODIUM 70 MG/1
70 TABLET ORAL
Qty: 13 TABLET | Refills: 3 | Status: SHIPPED | OUTPATIENT
Start: 2019-08-12 | End: 2020-07-28 | Stop reason: SDUPTHER

## 2019-08-12 RX ORDER — ATENOLOL 100 MG/1
100 TABLET ORAL DAILY
Qty: 90 TABLET | Refills: 1 | Status: SHIPPED | OUTPATIENT
Start: 2019-08-12 | End: 2020-02-03 | Stop reason: SDUPTHER

## 2019-08-12 RX ORDER — LOSARTAN POTASSIUM 50 MG/1
50 TABLET ORAL DAILY
Qty: 90 TABLET | Refills: 1 | Status: SHIPPED | OUTPATIENT
Start: 2019-08-12 | End: 2020-02-03 | Stop reason: SDUPTHER

## 2019-08-12 RX ORDER — ACETAMINOPHEN 500 MG
500 TABLET ORAL EVERY 6 HOURS PRN
COMMUNITY

## 2019-08-12 RX ORDER — SPIRONOLACTONE 50 MG/1
100 TABLET, FILM COATED ORAL DAILY
Qty: 180 TABLET | Refills: 1 | Status: SHIPPED | OUTPATIENT
Start: 2019-08-12 | End: 2020-02-03 | Stop reason: SDUPTHER

## 2019-08-12 ASSESSMENT — ENCOUNTER SYMPTOMS: SHORTNESS OF BREATH: 0

## 2019-08-12 NOTE — PROGRESS NOTES
Patient ID: Flaquito Roldan 1955    Chief Complaint   Patient presents with    Hypertension    Gastroesophageal Reflux     not taking pantroprazole any longer         Hypertension   This is a chronic problem. The current episode started more than 1 year ago. The problem is controlled. Pertinent negatives include no palpitations or shortness of breath. Risk factors for coronary artery disease include post-menopausal state. Past treatments include angiotensin blockers and diuretics. There are no compliance problems. Gastroesophageal Reflux   This is a chronic problem. The problem has been resolved. Treatments tried: better with stopping Protonix. The treatment provided significant relief. Review of Systems   Constitutional: Negative for unexpected weight change. Respiratory: Negative for shortness of breath. Cardiovascular: Negative for palpitations.        Patient Active Problem List   Diagnosis    Goiter    Hearing loss    Impaired glucose tolerance    Hyperlipidemia    GERD (gastroesophageal reflux disease)    Essential hypertension    High serum renin    Serum calcium elevated    Colon polyp    Acute superficial gastritis without hemorrhage    Age-related osteoporosis without current pathological fracture    Hiatal hernia       Past Medical History:   Diagnosis Date    Colon polyp 05/2012    Dry eyes 3/2014    Dr. Mary Dang     Benign    Hearing loss     very [place]    Hyperlipidemia 2002    Hypertension 2001    Impaired glucose tolerance 1/2013    Thyroid nodule     for bx 3/17/2016       Past Surgical History:   Procedure Laterality Date    BREAST BIOPSY      COLONOSCOPY  2012    polyp (Laith Grow)    COLONOSCOPY  09/05/2017    repeat 5 years    HYSTERECTOMY  1990's    complete (fibroids)    THYROID SURGERY      nodule    UPPER GASTROINTESTINAL ENDOSCOPY  08/29/2017    ulcer, non bleeding    UPPER GASTROINTESTINAL ENDOSCOPY  12/06/2017       Family History

## 2019-08-14 ENCOUNTER — HOSPITAL ENCOUNTER (OUTPATIENT)
Age: 64
Discharge: HOME | End: 2019-08-14
Payer: MEDICARE

## 2019-08-14 LAB
ALBUMIN SERPL-MCNC: 4.3 GM/DL (ref 3.4–5)
ALP BLD-CCNC: 61 IU/L (ref 40–129)
ALT SERPL-CCNC: 8 U/L (ref 10–40)
ANION GAP SERPL CALCULATED.3IONS-SCNC: 10 MMOL/L (ref 4–16)
AST SERPL-CCNC: 13 IU/L (ref 15–37)
BILIRUB SERPL-MCNC: 0.4 MG/DL (ref 0–1)
BUN BLDV-MCNC: 11 MG/DL (ref 6–23)
CALCIUM SERPL-MCNC: 9.9 MG/DL (ref 8.3–10.6)
CHLORIDE BLD-SCNC: 101 MMOL/L (ref 99–110)
CO2: 28 MMOL/L (ref 21–32)
CREAT SERPL-MCNC: 0.9 MG/DL (ref 0.6–1.1)
ESTIMATED AVERAGE GLUCOSE: 111 MG/DL
GFR AFRICAN AMERICAN: >60 ML/MIN/1.73M2
GFR NON-AFRICAN AMERICAN: >60 ML/MIN/1.73M2
GLUCOSE BLD-MCNC: 82 MG/DL (ref 70–99)
HBA1C MFR BLD: 5.5 % (ref 4.2–6.3)
POTASSIUM SERPL-SCNC: 3.9 MMOL/L (ref 3.5–5.1)
SODIUM BLD-SCNC: 139 MMOL/L (ref 135–145)
TOTAL PROTEIN: 7.6 GM/DL (ref 6.4–8.2)

## 2019-08-14 PROCEDURE — 82330 ASSAY OF CALCIUM: CPT

## 2019-08-14 PROCEDURE — 83036 HEMOGLOBIN GLYCOSYLATED A1C: CPT

## 2019-08-14 PROCEDURE — 82306 VITAMIN D 25 HYDROXY: CPT

## 2019-08-14 PROCEDURE — 83970 ASSAY OF PARATHORMONE: CPT

## 2019-08-14 PROCEDURE — 80053 COMPREHEN METABOLIC PANEL: CPT

## 2019-08-14 PROCEDURE — 36415 COLL VENOUS BLD VENIPUNCTURE: CPT

## 2019-08-16 LAB
CALCIUM IONIZED: 4.92 MG/DL (ref 4.48–5.28)
CALCIUM SERPL-MCNC: 9.7 MG/DL (ref 8.3–10.6)
IONIZED CA: 1.23 MMOL/L (ref 1.12–1.32)
PARATHYROID HORMONE INTACT: 42 PG/ML (ref 15–65)
PARATHYROID HORMONE INTACT: NORMAL PG/ML (ref 15–65)
VITAMIN D 1,25-DIHYDROXY: 35.2 PG/ML (ref 19.9–79.3)
VITAMIN D 1,25-DIHYDROXY: NORMAL PG/ML (ref 19.9–79.3)
VITAMIN D 25-HYDROXY: 34.78 NG/ML

## 2019-08-22 ENCOUNTER — PATIENT MESSAGE (OUTPATIENT)
Dept: FAMILY MEDICINE CLINIC | Age: 64
End: 2019-08-22

## 2019-11-12 ENCOUNTER — HOSPITAL ENCOUNTER (OUTPATIENT)
Dept: WOMENS IMAGING | Age: 64
Discharge: HOME | End: 2019-11-12
Payer: MEDICARE

## 2019-11-12 DIAGNOSIS — Z12.39 SCREENING BREAST EXAMINATION: ICD-10-CM

## 2019-11-12 PROCEDURE — 77067 SCR MAMMO BI INCL CAD: CPT

## 2020-02-03 ENCOUNTER — OFFICE VISIT (OUTPATIENT)
Dept: FAMILY MEDICINE CLINIC | Age: 65
End: 2020-02-03
Payer: MEDICARE

## 2020-02-03 VITALS
BODY MASS INDEX: 21.95 KG/M2 | HEART RATE: 88 BPM | SYSTOLIC BLOOD PRESSURE: 122 MMHG | DIASTOLIC BLOOD PRESSURE: 82 MMHG | WEIGHT: 136 LBS

## 2020-02-03 PROBLEM — Z72.0 TOBACCO ABUSE: Status: ACTIVE | Noted: 2020-02-03

## 2020-02-03 PROCEDURE — G8420 CALC BMI NORM PARAMETERS: HCPCS | Performed by: FAMILY MEDICINE

## 2020-02-03 PROCEDURE — G8427 DOCREV CUR MEDS BY ELIG CLIN: HCPCS | Performed by: FAMILY MEDICINE

## 2020-02-03 PROCEDURE — G8399 PT W/DXA RESULTS DOCUMENT: HCPCS | Performed by: FAMILY MEDICINE

## 2020-02-03 PROCEDURE — G0008 ADMIN INFLUENZA VIRUS VAC: HCPCS | Performed by: FAMILY MEDICINE

## 2020-02-03 PROCEDURE — G8482 FLU IMMUNIZE ORDER/ADMIN: HCPCS | Performed by: FAMILY MEDICINE

## 2020-02-03 PROCEDURE — 1090F PRES/ABSN URINE INCON ASSESS: CPT | Performed by: FAMILY MEDICINE

## 2020-02-03 PROCEDURE — 4004F PT TOBACCO SCREEN RCVD TLK: CPT | Performed by: FAMILY MEDICINE

## 2020-02-03 PROCEDURE — 4040F PNEUMOC VAC/ADMIN/RCVD: CPT | Performed by: FAMILY MEDICINE

## 2020-02-03 PROCEDURE — 3017F COLORECTAL CA SCREEN DOC REV: CPT | Performed by: FAMILY MEDICINE

## 2020-02-03 PROCEDURE — 90653 IIV ADJUVANT VACCINE IM: CPT | Performed by: FAMILY MEDICINE

## 2020-02-03 PROCEDURE — 0509F URINE INCON PLAN DOCD: CPT | Performed by: FAMILY MEDICINE

## 2020-02-03 PROCEDURE — 99213 OFFICE O/P EST LOW 20 MIN: CPT | Performed by: FAMILY MEDICINE

## 2020-02-03 PROCEDURE — 1123F ACP DISCUSS/DSCN MKR DOCD: CPT | Performed by: FAMILY MEDICINE

## 2020-02-03 RX ORDER — SPIRONOLACTONE 50 MG/1
100 TABLET, FILM COATED ORAL DAILY
Qty: 180 TABLET | Refills: 1 | Status: SHIPPED | OUTPATIENT
Start: 2020-02-03 | End: 2020-07-28 | Stop reason: SDUPTHER

## 2020-02-03 RX ORDER — ATENOLOL 100 MG/1
100 TABLET ORAL DAILY
Qty: 90 TABLET | Refills: 1 | Status: SHIPPED | OUTPATIENT
Start: 2020-02-03 | End: 2020-07-28 | Stop reason: SDUPTHER

## 2020-02-03 RX ORDER — SOLIFENACIN SUCCINATE 5 MG/1
5 TABLET, FILM COATED ORAL DAILY
Qty: 90 TABLET | Refills: 1 | Status: SHIPPED | OUTPATIENT
Start: 2020-02-03 | End: 2020-09-21

## 2020-02-03 RX ORDER — LOSARTAN POTASSIUM 50 MG/1
50 TABLET ORAL DAILY
Qty: 90 TABLET | Refills: 1 | Status: SHIPPED | OUTPATIENT
Start: 2020-02-03 | End: 2020-07-28 | Stop reason: SDUPTHER

## 2020-02-03 ASSESSMENT — ENCOUNTER SYMPTOMS
SHORTNESS OF BREATH: 0
CHEST TIGHTNESS: 0

## 2020-02-03 ASSESSMENT — PATIENT HEALTH QUESTIONNAIRE - PHQ9
2. FEELING DOWN, DEPRESSED OR HOPELESS: 1
SUM OF ALL RESPONSES TO PHQ9 QUESTIONS 1 & 2: 1
SUM OF ALL RESPONSES TO PHQ QUESTIONS 1-9: 1
1. LITTLE INTEREST OR PLEASURE IN DOING THINGS: 0
SUM OF ALL RESPONSES TO PHQ QUESTIONS 1-9: 1

## 2020-02-03 NOTE — PROGRESS NOTES
Patient ID: Rolin Holter 1955    . Chief Complaint   Patient presents with    Hypertension         Hypertension   This is a chronic problem. The current episode started more than 1 year ago. The problem is controlled. Pertinent negatives include no palpitations or shortness of breath. Risk factors for coronary artery disease include post-menopausal state. Past treatments include angiotensin blockers and diuretics. There are no compliance problems. Review of Systems   Respiratory: Negative for chest tightness and shortness of breath. Cardiovascular: Negative for palpitations and leg swelling. Patient Active Problem List   Diagnosis    Goiter    Hearing loss    Hyperlipidemia    GERD (gastroesophageal reflux disease)    Essential hypertension    High serum renin    Colon polyp    Acute superficial gastritis without hemorrhage    Age-related osteoporosis without current pathological fracture    Hiatal hernia    Tobacco abuse       Past Surgical History:   Procedure Laterality Date    BREAST BIOPSY      COLONOSCOPY  2012    polyp ([address])    COLONOSCOPY  09/05/2017    repeat 5 years    HYSTERECTOMY  1990's    complete (fibroids)    THYROID SURGERY      nodule    UPPER GASTROINTESTINAL ENDOSCOPY  08/29/2017    ulcer, non bleeding    UPPER GASTROINTESTINAL ENDOSCOPY  12/06/2017       Family History   Problem Relation Age of Onset    Diabetes Sister         type 2    Breast Cancer Sister     Lung Cancer Brother         Laryngeal       Current Outpatient Medications on File Prior to Visit   Medication Sig Dispense Refill    acetaminophen (TYLENOL) 500 MG tablet Take 500 mg by mouth every 6 hours as needed for Pain      alendronate (FOSAMAX) 70 MG tablet Take 1 tablet by mouth every 7 days 13 tablet 3    Probiotic Product (PROBIOTIC DAILY PO) Take 1 tablet by mouth daily      aspirin 81 MG chewable tablet Take 81 mg by mouth daily.         [DISCONTINUED] solifenacin (VESICARE) 5 MG tablet Take 1 tablet by mouth daily. 90 tablet 1     No current facility-administered medications on file prior to visit. Objective:         Physical Exam  Vitals signs and nursing note reviewed. Constitutional:       Appearance: She is well-developed. HENT:      Head: Normocephalic and atraumatic. Right Ear: Decreased hearing noted. Left Ear: Decreased hearing noted. Neck:      Musculoskeletal: Neck supple. Cardiovascular:      Rate and Rhythm: Normal rate and regular rhythm. Heart sounds: Normal heart sounds, S1 normal and S2 normal.   Pulmonary:      Effort: Pulmonary effort is normal. No respiratory distress. Breath sounds: Normal breath sounds. No wheezing. Skin:     General: Skin is warm and dry. Comments: Smells of tobacco   Neurological:      Mental Status: She is alert. Vitals:    02/03/20 1350   BP: 122/82   Site: Left Upper Arm   Position: Sitting   Cuff Size: Medium Adult   Pulse: 88   Weight: 136 lb (61.7 kg)     Body mass index is 21.95 kg/m². Wt Readings from Last 3 Encounters:   02/03/20 136 lb (61.7 kg)   08/12/19 127 lb (57.6 kg)   04/12/19 118 lb 9.6 oz (53.8 kg)     BP Readings from Last 3 Encounters:   02/03/20 122/82   08/12/19 110/68   04/12/19 120/88          No results found for this visit on 02/03/20.   The 10-year ASCVD risk score (Corbin Moura, et al., 2013) is: 15.9%    Values used to calculate the score:      Age: 75 years      Sex: Female      Is Non- : Yes      Diabetic: No      Tobacco smoker: Yes      Systolic Blood Pressure: 823 mmHg      Is BP treated: Yes      HDL Cholesterol: 47 mg/dL      Total Cholesterol: 185 mg/dL  Lab Review   Hospital Outpatient Visit on 08/14/2019   Component Date Value    Hemoglobin A1C 08/14/2019 5.5     eAG 08/14/2019 111     Sodium 08/14/2019 139     Potassium 08/14/2019 3.9     Chloride 08/14/2019 101     CO2 08/14/2019 28     BUN 08/14/2019 Hypoparathyroidism                             M = Hypercalcemia of Malignancy  Performed by [facility] [address], [address] [phone]  www. Avril Shannon MD, Lab. Director      Calcium 08/14/2019 9.7     Ionized Ca 08/14/2019 1.23     Calcium, Ion 08/14/2019 4.92     Vit D, 1,25-Dihydroxy 08/14/2019 35.2     Vit D, 1,25-Dihydroxy 08/14/2019                      Value:(NOTE)  INTERPRETIVE INFORMATION: Vitamin D, 1,25-Dihydroxy  This test is primarily indicated during patient evaluation for   hypercalcemia and renal failure. A normal result does not rule out   Vitamin D deficiency. The recommended test for diagnosing Vitamin   D deficiency is Vitamin D 25-hydroxy. Performed by [facility] [address], [address] [phone]  www. Avril Shannon MD, Lab. Director      Vit D, 25-Hydroxy 08/14/2019 34.78            Assessment:       Diagnosis Orders   1. Essential hypertension  losartan (COZAAR) 50 MG tablet    atenolol (TENORMIN) 100 MG tablet    spironolactone (ALDACTONE) 50 MG tablet   2. Urinary incontinence, unspecified type  solifenacin (VESICARE) 5 MG tablet   3. Tobacco abuse     4. Need for influenza vaccination  INFLUENZA, TRIV, INACTIVATED, SUBUNIT, ADJUVANTED, 65 YRS AND OLDER, IM, PREFILL SYR, 0.5ML (FLUAD TRIV)           Plan:      Recommended to quit smoking    HTN stable.   Continue current medication      Return in about 25 weeks (around 7/27/2020) for HTN, Medicare physical.

## 2020-02-03 NOTE — PROGRESS NOTES
Vaccine Information Sheet, \"Influenza - Inactivated\"  given to Vaughn Doshi, or parent/legal guardian of  Vaughn Doshi and verbalized understanding. Patient responses:    Have you ever had a reaction to a flu vaccine? No  Do you have any current illness? No  Have you ever had Guillian Roosevelt Syndrome? No  Do you have a serious allergy to any of the follow: Neomycin, Polymyxin, Thimerosal, eggs or egg products? No    Flu vaccine given per order. Please see immunization tab. Risks and benefits explained. Current VIS given.

## 2020-02-03 NOTE — PATIENT INSTRUCTIONS
The diagnoses and medications listed in this after visit summary may not be accurate at the time of check out. Please check MY CHART in 28-48 hours for possible corrections. Late cancellation policy: So that we can better accommodate people who are sick, please give our office 24 hour notice for an appointment cancellation. Thank you. Missed appointments: Your care is very important to us. It is important that you keep your scheduled appointments. Multiple missed appointments may lead to a dismissal from the office. Later arrival policy: If you are more than 10 minutes late for your appointment, you will be asked to reschedule. Please allow 5-7 business days for paperwork to be processed. It is important that you check your MY Chart messages, as they include appointment reminders, test results, and other important information. If you have forgotten your password, please call [phone]. Learning About Benefits From Quitting Smoking  How does quitting smoking make you healthier? If you're thinking about quitting smoking, you may have a few reasons to be smoke-free. Your health may be one of them. · When you quit smoking, you lower your risks for cancer, lung disease, heart attack, stroke, blood vessel disease, and blindness from macular degeneration. · When you're smoke-free, you get sick less often, and you heal faster. You are less likely to get colds, flu, bronchitis, and pneumonia. · As a nonsmoker, you may find that your mood is better and you are less stressed. When and how will you feel healthier? Quitting has real health benefits that start from day 1 of being smoke-free. And the longer you stay smoke-free, the healthier you get and the better you feel. The first hours  · After just 20 minutes, your blood pressure and heart rate go down. That means there's less stress on your heart and blood vessels.   · Within 12 hours, the level of carbon monoxide in your blood drops back to normal. That makes room for more oxygen. With more oxygen in your body, you may notice that you have more energy than when you smoked. After 2 weeks  · Your lungs start to work better. · Your risk of heart attack starts to drop. After 1 month  · When your lungs are clear, you cough less and breathe deeper, so it's easier to be active. · Your sense of taste and smell return. That means you can enjoy food more than you have since you started smoking. Over the years  · After 1 year, your risk of heart disease is half what it would be if you kept smoking. · After 5 years, your risk of stroke starts to shrink. Within a few years after that, it's about the same as if you'd never smoked. · After 10 years, your risk of dying from lung cancer is cut by about half. And your risk for many other types of cancer is lower too. How would quitting help others in your life? When you quit smoking, you improve the health of everyone who now breathes in your smoke. · Their heart, lung, and cancer risks drop, much like yours. · They are sick less. For babies and small children, living smoke-free means they're less likely to have ear infections, pneumonia, and bronchitis. · If you're a woman who is or will be pregnant someday, quitting smoking means a healthier . · Children who are close to you are less likely to become adult smokers. Where can you learn more? Go to https://TrueMotion SpineerinWebTuner.YourPOV.TV. org and sign in to your [employer] account. Enter [phone] in the [facility] box to learn more about \"Learning About Benefits From Quitting Smoking. \"     If you do not have an account, please click on the \"Sign Up Now\" link. Current as of: 2018  Content Version: 12.0  © 0024-7662 Healthwise, Incorporated. Care instructions adapted under license by [facility]  If you have questions about a medical condition or this instruction, always ask your healthcare professional. [employer] disclaims any warranty or liability for your use of this information.

## 2020-06-25 RX ORDER — ALENDRONATE SODIUM 70 MG/1
TABLET ORAL
Qty: 12 TABLET | Refills: 3 | OUTPATIENT
Start: 2020-06-25

## 2020-07-28 ENCOUNTER — OFFICE VISIT (OUTPATIENT)
Dept: FAMILY MEDICINE CLINIC | Age: 65
End: 2020-07-28
Payer: MEDICARE

## 2020-07-28 VITALS
OXYGEN SATURATION: 99 % | HEART RATE: 64 BPM | TEMPERATURE: 97 F | HEIGHT: 66 IN | BODY MASS INDEX: 18.42 KG/M2 | DIASTOLIC BLOOD PRESSURE: 80 MMHG | WEIGHT: 114.6 LBS | SYSTOLIC BLOOD PRESSURE: 122 MMHG

## 2020-07-28 LAB
A/G RATIO: 1.6 (ref 1.1–2.2)
ALBUMIN SERPL-MCNC: 4.4 G/DL (ref 3.4–5)
ALP BLD-CCNC: 42 U/L (ref 40–129)
ALT SERPL-CCNC: 8 U/L (ref 10–40)
ANION GAP SERPL CALCULATED.3IONS-SCNC: 15 MMOL/L (ref 3–16)
AST SERPL-CCNC: 13 U/L (ref 15–37)
BILIRUB SERPL-MCNC: <0.2 MG/DL (ref 0–1)
BUN BLDV-MCNC: 8 MG/DL (ref 7–20)
CALCIUM SERPL-MCNC: 9.2 MG/DL (ref 8.3–10.6)
CHLORIDE BLD-SCNC: 105 MMOL/L (ref 99–110)
CO2: 23 MMOL/L (ref 21–32)
CREAT SERPL-MCNC: 0.7 MG/DL (ref 0.6–1.2)
GFR AFRICAN AMERICAN: >60
GFR NON-AFRICAN AMERICAN: >60
GLOBULIN: 2.8 G/DL
GLUCOSE BLD-MCNC: 109 MG/DL (ref 70–99)
POTASSIUM SERPL-SCNC: 4.2 MMOL/L (ref 3.5–5.1)
SODIUM BLD-SCNC: 143 MMOL/L (ref 136–145)
TOTAL PROTEIN: 7.2 G/DL (ref 6.4–8.2)

## 2020-07-28 PROCEDURE — 36415 COLL VENOUS BLD VENIPUNCTURE: CPT | Performed by: FAMILY MEDICINE

## 2020-07-28 PROCEDURE — 4040F PNEUMOC VAC/ADMIN/RCVD: CPT | Performed by: FAMILY MEDICINE

## 2020-07-28 PROCEDURE — G0009 ADMIN PNEUMOCOCCAL VACCINE: HCPCS | Performed by: FAMILY MEDICINE

## 2020-07-28 PROCEDURE — 3017F COLORECTAL CA SCREEN DOC REV: CPT | Performed by: FAMILY MEDICINE

## 2020-07-28 PROCEDURE — 1123F ACP DISCUSS/DSCN MKR DOCD: CPT | Performed by: FAMILY MEDICINE

## 2020-07-28 PROCEDURE — G0438 PPPS, INITIAL VISIT: HCPCS | Performed by: FAMILY MEDICINE

## 2020-07-28 PROCEDURE — 90732 PPSV23 VACC 2 YRS+ SUBQ/IM: CPT | Performed by: FAMILY MEDICINE

## 2020-07-28 RX ORDER — LOSARTAN POTASSIUM 50 MG/1
50 TABLET ORAL DAILY
Qty: 90 TABLET | Refills: 1 | Status: SHIPPED | OUTPATIENT
Start: 2020-07-28 | End: 2021-01-25

## 2020-07-28 RX ORDER — ALENDRONATE SODIUM 70 MG/1
70 TABLET ORAL
Qty: 13 TABLET | Refills: 3 | Status: SHIPPED | OUTPATIENT
Start: 2020-07-28 | End: 2021-08-03 | Stop reason: SDUPTHER

## 2020-07-28 RX ORDER — SPIRONOLACTONE 50 MG/1
100 TABLET, FILM COATED ORAL DAILY
Qty: 180 TABLET | Refills: 1 | Status: SHIPPED | OUTPATIENT
Start: 2020-07-28 | End: 2021-01-19 | Stop reason: SDUPTHER

## 2020-07-28 RX ORDER — ATENOLOL 100 MG/1
100 TABLET ORAL DAILY
Qty: 90 TABLET | Refills: 1 | Status: SHIPPED | OUTPATIENT
Start: 2020-07-28 | End: 2021-01-19 | Stop reason: SDUPTHER

## 2020-07-28 ASSESSMENT — VISUAL ACUITY
OD_CC: 20/40
OS_CC: 20/50

## 2020-07-28 ASSESSMENT — PATIENT HEALTH QUESTIONNAIRE - PHQ9
SUM OF ALL RESPONSES TO PHQ QUESTIONS 1-9: 1
SUM OF ALL RESPONSES TO PHQ QUESTIONS 1-9: 1

## 2020-07-28 ASSESSMENT — LIFESTYLE VARIABLES: HOW OFTEN DO YOU HAVE A DRINK CONTAINING ALCOHOL: 1

## 2020-07-28 NOTE — PATIENT INSTRUCTIONS
October Fifth is the DEADLINE for voter registration for the November Third GENERAL ELECTION. Don't forget to vote!!!        [address] TO ALL APPOINTMENTS    The diagnoses and medications listed in this after visit summary may not be accurate at the time of check out. Please check MY CHART in 28-48 hours for possible corrections. Late cancellation policy: So that we can better accommodate people who are sick, please give our office 24 hour notice for an appointment cancellation. Thank you. Missed appointments: Your care is very important to us. It is important that you keep your scheduled appointments. Multiple missed appointments will lead to a dismissal from the office. Later arrival policy: If you are more than 10 minutes late for your appointment, you will be asked to reschedule. Please allow 5-7 business days for paperwork to be processed. It is important that you check your MY Chart messages, as they include appointment reminders, test results, and other important information. If you have forgotten your password, please call [phone]. Patient Education        alendronate  Pronunciation:  a HEATHER [place] scotty  Brand:  Binosto, Fosamax  What is the most important information I should know about alendronate? You should not take alendronate if you have problems with your esophagus, or low levels of calcium in your blood. Do not take alendronate if you cannot sit upright or stand for at least 30 minutes after taking the medicine. Alendronate can cause serious problems in the stomach or esophagus. Stop using alendronate and call your doctor at once if you have chest pain, new or worsening heartburn, or pain when swallowing. Also call your doctor if you have muscle spasms, numbness or tingling (in hands and feet or around the mouth), new or unusual hip pain, or severe pain in your joints, bones, or muscles. What is alendronate?   Alendronate is used to treat osteoporosis caused by menopause, steroid use, or gonadal failure. This medicine is for use when you have a high risk of bone fracture due to osteoporosis. Alendronate is also used to treat Paget's disease of bone. Alendronate may also be used for purposes not listed in this medication guide. What should I discuss with my healthcare provider before taking alendronate? You should not take alendronate if you are allergic to it, or if you have:  · low levels of calcium in your blood (hypocalcemia); or  · problems with the muscles in your esophagus (the tube that connects your mouth and stomach). Do not take alendronate if you cannot sit upright or stand for at least 30 minutes. Alendronate can cause serious problems in the stomach or esophagus. You must stay upright for at least 30 minutes after taking this medicine. Tell your doctor if you have ever had:  · trouble swallowing;  · problems with your stomach or digestion;  · hypocalcemia;  · a dental problem (you may need a dental exam before you begin taking alendronate);  · kidney disease; or  · any condition that makes it hard for your body to absorb nutrients from food (malabsorption). The effervescent tablet contains a lot of sodium. Tell your doctor if you are on a low-salt diet before using this form of alendronate. This medicine may cause jaw bone problems (osteonecrosis). The risk is highest in people with cancer, blood cell disorders, pre-existing dental problems, or people treated with steroids, chemotherapy, or radiation. Ask your doctor about your own risk. It is not known whether this medicine will harm an unborn baby. Tell your doctor if you are pregnant or trying to become pregnant. Stop using the medicine and tell your doctor right away if you become pregnant. It may not be safe to breastfeed while using this medicine. Ask your doctor about any risk. How should I take alendronate?   Follow all directions on your prescription label and read all medication guides or instruction sheets. Use the medicine exactly as directed. Alendronate is taken either once daily or once per week. Follow your doctor's dosing instructions very carefully. Take alendronate first thing in the morning, at least 30 minutes before you eat or drink anything or take any other medicine. If you take alendronate only once per week, take it on the same day each week and always first thing in the morning. Take with a full glass (6 to 8 ounces) of plain water. Do not use coffee, tea, soda, juice, or mineral water. Do not eat or drink anything other than plain water. Measure liquid medicine carefully. Use the dosing syringe provided, or use a medicine dose-measuring device (not a kitchen spoon). Do not crush, chew, or suck on an alendronate regular tablet. Swallow it whole. Dissolve the effervescent tablet in at least 4 ounces of water (at room temperature, not hot or cold). Let the tablet dissolve for 5 minutes. Stir this mixture for 10 seconds and drink all of it right away. Add a little more water to the glass, swirl gently and drink right away. For at least 30 minutes after taking alendronate:   · Do not lie down or recline. · Do not take any other medicine including vitamins, calcium, or antacids. Pay special attention to your dental hygiene while taking alendronate. Brush and floss your teeth regularly. If you need to have any dental work (especially surgery), tell the dentist ahead of time that you are using alendronate. Alendronate is only part of a complete program of treatment that may also include diet changes, exercise, bone mineral density testing, and taking calcium and vitamin supplements. Follow your doctor's instructions very closely. Store at room temperature away from moisture and heat. Keep unused effervescent tablets in the foil blister pack. Your doctor will determine how long to treat you with this medicine.  Alendronate is often given for only 3 to 5 years.  What happens if I miss a dose? Once-daily dosing: If you forget to take alendronate first thing in the morning, do not take it later in the day. Wait until the following morning and skip the missed dose. Do not take two (2) doses in one day. Once-per-week dosing: If you forget to take alendronate on your scheduled day, take it first thing in the morning on the day after you remember the missed dose. Then return to your regular weekly schedule on your chosen dose day. Do not take 2 doses in one day. What happens if I overdose? Drink a full glass of milk and seek emergency medical attention or call the Poison Help line at 1-585.879.6435. Do not make yourself vomit and do not lie down. What should I avoid while taking alendronate? Avoid taking any other medicines for at least 30 minutes after taking alendronate. This includes vitamins, calcium, and antacids. Some medicines can make it harder for your body to absorb alendronate. Avoid smoking, or try to quit. Smoking can reduce your bone mineral density, making fractures more likely. Avoid drinking large amounts of alcohol. Heavy drinking can also cause bone loss. What are the possible side effects of alendronate? Get emergency medical help if you have signs of an allergic reaction: hives; wheezing, difficulty breathing; swelling of your face, lips, tongue, or throat. Stop using alendronate and call your doctor at once if you have:  · chest pain, new or worsening heartburn;  · difficulty or pain when swallowing;  · pain or burning under the ribs or in the back;  · severe heartburn, burning pain in your upper stomach, or coughing up blood;  · new or unusual pain in your thigh or hip;  · jaw pain, numbness, or swelling;  · severe joint, bone, or muscle pain; or  · low calcium levels --muscle spasms or contractions, numbness or tingly feeling (around your mouth, or in your fingers and toes).   Common side effects may include:  · heartburn, upset stomach;  · stomach pain, nausea;  · diarrhea, constipation; or  · bone pain, muscle or joint pain. This is not a complete list of side effects and others may occur. Call your doctor for medical advice about side effects. You may report side effects to FDA at 0-083-FDA-9437. What other drugs will affect alendronate? Tell your doctor about all your other medicines, especially:  · aspirin; or  · NSAIDs (nonsteroidal anti-inflammatory drugs) --ibuprofen (Advil, Motrin), naproxen (Aleve), celecoxib, diclofenac, indomethacin, meloxicam, and others. This list is not complete. Other drugs may affect alendronate, including prescription and over-the-counter medicines, vitamins, and herbal products. Not all possible drug interactions are listed here. Where can I get more information? Your pharmacist can provide more information about alendronate. Remember, keep this and all other medicines out of the reach of children, never share your medicines with others, and use this medication only for the indication prescribed. Every effort has been made to ensure that the information provided by [address] is accurate, up-to-date, and complete, but no guarantee is made to that effect. Drug information contained herein may be time sensitive. [facility] information has been compiled for use by healthcare practitioners and consumers in the [facility] operated by [facility] and therefore [facility] does not warrant that uses outside of the [facility] are appropriate, unless specifically indicated otherwise. [facility] drug information does not endorse drugs, diagnose patients or recommend therapy. [facility] drug information is an informational resource designed to assist licensed healthcare practitioners in caring for their patients and/or to serve consumers viewing this service as a supplement to, and not a substitute for, the expertise, skill, knowledge and judgment of healthcare practitioners.  The absence of a warning for a given drug or drug combination in no way should be construed to indicate that the drug or drug combination is safe, effective or appropriate for any given patient. [facility] does not assume any responsibility for any aspect of healthcare administered with the aid of information [facility] provides. The information contained herein is not intended to cover all possible uses, directions, precautions, warnings, drug interactions, allergic reactions, or adverse effects. If you have questions about the drugs you are taking, check with your doctor, nurse or pharmacist.  Copyright 0075-3527 [address]: 14.01. Revision date: 8/22/2019. Care instructions adapted under license by [facility] ([facility]). If you have questions about a medical condition or this instruction, always ask your healthcare professional. [address] any warranty or liability for your use of this information. Patient Education        Deciding About Bisphosphonate Medicine for Osteoporosis  How can you decide about taking bisphosphonate medicine for osteoporosis? This information is right for you if you are a woman who has been through menopause. If that does not describe you, or if you're not sure, talk with your doctor about this decision. What is osteoporosis? Osteoporosis is a disease that affects your bones. It means you have bones that are thin and brittle and have lots of holes inside them like a sponge. This makes them easy to break. It can lead to broken bones (fractures), especially in the hip, spine, and wrist. These fractures may make it hard for you to live on your own. Bisphosphonates are the most common medicines used to prevent bone loss. They may be taken as a pill or an injection into a vein. Bisphosphonates slow the way bone dissolves and is absorbed by your body. They can increase bone thickness and strength. What are key points about this decision?   · If you are at a higher risk of having a fracture, taking bisphosphonates is more likely to help you prevent a fracture. If your risk of a fracture is lower, it's less likely that these medicines will help you. · Bisphosphonates can cause problems with the jaw or thigh bone. But most women do not have these side effects. · Whether you take medicine or not, healthy habits can help protect your bones. Get enough calcium and vitamin D. Get regular weight-bearing exercise. Cut back on alcohol. And if you smoke, quit. Who is helped the most by bisphosphonates? For women who have been through menopause:  · If you have osteoporosis (your T-score is -2.5 or less) or you have had a fracture, taking bisphosphonates lowers your risk of having a fracture. · If you haven't had a fracture and you have low bone density (your T-score is between -1.0 and -2.5, sometimes called osteopenia), taking bisphosphonates might lower your risk of having a fracture. This evidence is not as strong. What are the side effects of bisphosphonates? These medicines can have side effects, such as heartburn and belly pain. Certain bone problems have also been reported in women taking bisphosphonates. Out of 1,000 people, about 1 person has a bone side effect during a year of taking bisphosphonates. That means 999 out of 1,000 people do not have a bone side effect. These bone side effects include problems with the jaw bone (called osteonecrosis). They also might include a certain kind of fracture of the thigh bone (called an atypical fracture), but more research is needed to find out if taking bisphosphonates is a cause of these fractures. Your decision  Thinking about the facts and your feelings can help you make a decision that is right for you. Be sure you understand the benefits and risks of your options, and think about what else you need to do before you make the decision. Where can you learn more? Go to https://benedicto.[employer]. org and sign in to your [employer] account.  Enter U118 in the [facility] box to learn more about \"Deciding About Bisphosphonate Medicine for Osteoporosis. \"     If you do not have an account, please click on the \"Sign Up Now\" link. Current as of: August 7, 2019               Content Version: 12.5  © 7362-9773 Healthwise, Incorporated. Care instructions adapted under license by [facility] ([facility]). If you have questions about a medical condition or this instruction, always ask your healthcare professional. [address] any warranty or liability for your use of this information. Personalized Preventive Plan for Dali Barton - 7/28/2020  Medicare offers a range of preventive health benefits. Some of the tests and screenings are paid in full while other may be subject to a deductible, co-insurance, and/or copay. Some of these benefits include a comprehensive review of your medical history including lifestyle, illnesses that may run in your family, and various assessments and screenings as appropriate. After reviewing your medical record and screening and assessments performed today your provider may have ordered immunizations, labs, imaging, and/or referrals for you. A list of these orders (if applicable) as well as your Preventive Care list are included within your After Visit Summary for your review. Other Preventive Recommendations:    · A preventive eye exam performed by an eye specialist is recommended every 1-2 years to screen for glaucoma; cataracts, macular degeneration, and other eye disorders. · A preventive dental visit is recommended every 6 months. · Try to get at least 150 minutes of exercise per week or 10,000 steps per day on a pedometer . · Order or download the FREE \"Exercise & Physical Activity: Your Everyday Guide\" from [employer]. Call 0-571.395.6386 or search [employer] online. · You need 5223-0050 mg of calcium and 7866-4047 IU of vitamin D per day. It is possible to meet your calcium requirement with diet alone, but a vitamin D supplement is usually necessary to meet this goal.  · When exposed to the sun, use a sunscreen that protects against both UVA and UVB radiation with an SPF of 30 or greater. Reapply every 2 to 3 hours or after sweating, drying off with a towel, or swimming. · Always wear a seat belt when traveling in a car. Always wear a helmet when riding a bicycle or motorcycle.

## 2020-07-28 NOTE — PROGRESS NOTES
Medicare Annual Wellness Visit  Name: Kenneth Milligan Date: 2020   MRN: [id number] Sex: Female   Age: 77 y.o. Ethnicity: Non-/Non    : 1955 Race: Black      Carlo Moore is here for Medicare AWV and Hypertension    Screenings for behavioral, psychosocial and functional/safety risks, and cognitive dysfunction are all negative except as indicated below. These results, as well as other patient data from the [address] [facility], are documented in Flowsheets linked to this Encounter. Allergies   Allergen Reactions    Ace Inhibitors Other (See Comments)     cough         Prior to Visit Medications    Medication Sig Taking? Authorizing Provider   alendronate (FOSAMAX) 70 MG tablet Take 1 tablet by mouth every 7 days Yes Lobo Giordano MD   losartan (COZAAR) 50 MG tablet Take 1 tablet by mouth daily Yes Lobo Giordano MD   atenolol (TENORMIN) 100 MG tablet Take 1 tablet by mouth daily Yes Lobo Giordano MD   spironolactone (ALDACTONE) 50 MG tablet Take 2 tablets by mouth daily Yes Lobo Giordano MD   acetaminophen (TYLENOL) 500 MG tablet Take 500 mg by mouth every 6 hours as needed for Pain Yes Historical Provider, MD   aspirin 81 MG chewable tablet Take 81 mg by mouth daily. Yes Historical Provider, MD   solifenacin (VESICARE) 5 MG tablet Take 1 tablet by mouth daily  Patient not taking: Reported on 2020  Lobo Giordano MD   solifenacin (VESICARE) 5 MG tablet Take 1 tablet by mouth daily.   Lobo Giodrano MD         Past Medical History:   Diagnosis Date    Colon polyp 2012    Dry eyes 3/2014    Dr. Karina Vera     Benign    Hearing loss     very [place]    Hyperlipidemia 2002    Hypertension 2001    Impaired glucose tolerance 2013    Thyroid nodule     for bx 3/17/2016       Past Surgical History:   Procedure Laterality Date    BREAST BIOPSY      COLONOSCOPY      polyp ([address])    COLONOSCOPY  2017    repeat 5 years    HYSTERECTOMY 1990's    complete (fibroids)    THYROID SURGERY      nodule    UPPER GASTROINTESTINAL ENDOSCOPY  08/29/2017    ulcer, non bleeding    UPPER GASTROINTESTINAL ENDOSCOPY  12/06/2017         Family History   Problem Relation Age of Onset    Diabetes Sister         type 2    Breast Cancer Sister     Lung Cancer Brother         Laryngeal       CareTeam (Including outside providers/suppliers regularly involved in providing care):   Patient Care Team:  Fercho Atkinson MD as PCP - General (Family Medicine)  Fercho Atkinson MD as P[facility]  Loreto Lau DO as Consulting Physician (Obstetrics & Gynecology)  Donnell Bermudez MD as Consulting Physician (Gastroenterology)  Deepti Bermudez MD as Surgeon (Urology)  Bal Coates MD as Consulting Physician (Otolaryngology)  Elijah Falcon MD as Surgeon (Ophthalmology)    Wt Readings from Last 3 Encounters:   07/28/20 114 lb 9.6 oz (52 kg)   02/03/20 136 lb (61.7 kg)   08/12/19 127 lb (57.6 kg)     Vitals:    07/28/20 1404   BP: 122/80   Site: Left Upper Arm   Position: Sitting   Cuff Size: Medium Adult   Pulse: 64   Temp: 97 °F (36.1 °C)   TempSrc: Temporal   SpO2: 99%   Weight: 114 lb 9.6 oz (52 kg)   Height: 5' 6\" (1.676 m)     Body mass index is 18.5 kg/m². Based upon direct observation of the patient, evaluation of cognition reveals recent and remote memory intact. ENT: hearing abnormal- very poor hearing both ears. TM and ear canals normal  Pulmonary/Chest: clear to auscultation bilaterally- no wheezes, rales or rhonchi, normal air movement, no respiratory distress  Cardiovascular: normal rate, normal S1 and S2 and no gallops    Patient's complete Health Risk Assessment and screening values have been reviewed and are found in Flowsheets. The following problems were reviewed today and where indicated follow up appointments were made and/or referrals ordered.     Positive Risk Factor Screenings with Interventions:     Substance watching TV, or doing any of your daily activities because of your eyesight?: (!) Yes  Have you had an eye exam within the past year?: Yes  Hearing/Vision Interventions:  · Hearing concerns:  she has already seen ENT and there's not much that they can do for the hearing    Safety:  Safety  Do you have working smoke detectors?: Yes  Have all throw rugs been removed or fastened?: (!) No  Do you have non-slip mats or surfaces in all bathtubs/showers?: Yes  Do all of your stairways have a railing or banister?: (!) No  Are your doorways, halls and stairs free of clutter?: Yes  Do you always fasten your seatbelt when you are in a car?: Yes  Safety Interventions:  · Home safety tips provided    Personalized Preventive Plan   Current Health Maintenance Status  Immunization History   Administered Date(s) Administered    Influenza Virus Vaccine 11/20/2014    Influenza, High Dose (Fluzone 65 yrs and older) 10/29/2018    Influenza, Quadv, IM, (6 mo and older Fluzone, Flulaval, Fluarix and 3 yrs and older Afluria) 08/22/2016    Influenza, Quadv, IM, PF (6 mo and older Fluzone, Flulaval, Fluarix, and 3 yrs and older Afluria) 08/16/2017    Influenza, Triv, inactivated, subunit, adjuvanted, IM (Fluad 65 yrs and older) 02/03/2020    Pneumococcal Polysaccharide ([id number]) 07/28/2020    Tdap (Boostrix, Adacel) 08/29/2012        Health Maintenance   Topic Date Due    Shingles Vaccine (1 of 2) 05/20/2003    Annual Wellness Visit (AWV)  05/29/2019    Potassium monitoring  08/14/2020    Creatinine monitoring  08/14/2020    Flu vaccine (1) 09/01/2020    Breast cancer screen  11/12/2020    DTaP/Tdap/Td vaccine (2 - Td) 08/29/2022    Colon cancer screen colonoscopy  09/05/2022    Lipid screen  10/29/2023    DEXA (modify frequency per FRAX score)  Completed    Pneumococcal 65+ years Vaccine  Completed    Hepatitis C screen  Completed    Hepatitis A vaccine  Aged Out    Hepatitis B vaccine  Aged Out    Hib vaccine Aged Out    Meningococcal (ACWY) vaccine  Aged Out     Recommendations for [facility] Due: see orders and patient instructions/AVS.  . Recommended screening schedule for the next 5-10 years is provided to the patient in written form: see Patient Chris Comment was seen today for medicare awv and hypertension. Diagnoses and all orders for this visit:    Medicare annual wellness visit, subsequent    Hearing loss, unspecified hearing loss type, unspecified laterality    Hyperlipidemia, unspecified hyperlipidemia type    Essential hypertension  -     losartan (COZAAR) 50 MG tablet; Take 1 tablet by mouth daily  -     atenolol (TENORMIN) 100 MG tablet; Take 1 tablet by mouth daily  -     spironolactone (ALDACTONE) 50 MG tablet; Take 2 tablets by mouth daily  -     Comprehensive Metabolic Panel    Need for 23-polyvalent pneumococcal polysaccharide vaccine  -     PNEUMOVAX 23 subcutaneous/IM (Pneumococcal polysaccharide vaccine 23-valent >= 3yo)    Age-related osteoporosis without current pathological fracture  -     alendronate (FOSAMAX) 70 MG tablet; Take 1 tablet by mouth every 7 days               Patient was extremely hard of hearing for the visit. I had to write out a lot of what else trying to explain. I did give her patient handouts to review for him regarding osteoporosis and Fosamax. She complained loudly about getting a vaccine and a venipuncture. She complained about the technique that was here in the office in the past.  I offered to give her an order to get her blood work done elsewhere but she declined and decided to stay here.

## 2020-09-21 RX ORDER — SOLIFENACIN SUCCINATE 5 MG/1
TABLET, FILM COATED ORAL
Qty: 90 TABLET | Refills: 3 | Status: SHIPPED | OUTPATIENT
Start: 2020-09-21 | End: 2021-08-03 | Stop reason: SDUPTHER

## 2021-01-19 ENCOUNTER — OFFICE VISIT (OUTPATIENT)
Dept: FAMILY MEDICINE CLINIC | Age: 66
End: 2021-01-19
Payer: MEDICARE

## 2021-01-19 VITALS
SYSTOLIC BLOOD PRESSURE: 122 MMHG | WEIGHT: 140.8 LBS | BODY MASS INDEX: 22.73 KG/M2 | OXYGEN SATURATION: 96 % | DIASTOLIC BLOOD PRESSURE: 80 MMHG | TEMPERATURE: 97.8 F | HEART RATE: 67 BPM

## 2021-01-19 DIAGNOSIS — Z23 NEEDS FLU SHOT: ICD-10-CM

## 2021-01-19 DIAGNOSIS — Z12.31 ENCOUNTER FOR SCREENING MAMMOGRAM FOR MALIGNANT NEOPLASM OF BREAST: ICD-10-CM

## 2021-01-19 DIAGNOSIS — I10 ESSENTIAL HYPERTENSION: Primary | ICD-10-CM

## 2021-01-19 DIAGNOSIS — Z78.0 POST-MENOPAUSAL: ICD-10-CM

## 2021-01-19 PROCEDURE — 99213 OFFICE O/P EST LOW 20 MIN: CPT | Performed by: FAMILY MEDICINE

## 2021-01-19 PROCEDURE — G0008 ADMIN INFLUENZA VIRUS VAC: HCPCS | Performed by: FAMILY MEDICINE

## 2021-01-19 PROCEDURE — 90694 VACC AIIV4 NO PRSRV 0.5ML IM: CPT | Performed by: FAMILY MEDICINE

## 2021-01-19 RX ORDER — SPIRONOLACTONE 50 MG/1
100 TABLET, FILM COATED ORAL DAILY
Qty: 180 TABLET | Refills: 1 | Status: SHIPPED | OUTPATIENT
Start: 2021-01-19 | End: 2021-08-03 | Stop reason: SINTOL

## 2021-01-19 RX ORDER — ATENOLOL 100 MG/1
100 TABLET ORAL DAILY
Qty: 90 TABLET | Refills: 1 | Status: SHIPPED | OUTPATIENT
Start: 2021-01-19 | End: 2021-08-03 | Stop reason: SDUPTHER

## 2021-01-19 ASSESSMENT — ENCOUNTER SYMPTOMS
COUGH: 0
SHORTNESS OF BREATH: 0

## 2021-01-19 NOTE — PROGRESS NOTES
Patient ID: Rosy Rome 1955    . Chief Complaint   Patient presents with    Hypertension         Hypertension  This is a chronic problem. The current episode started more than 1 year ago. The problem is controlled. Pertinent negatives include no headaches, palpitations or shortness of breath. Risk factors for coronary artery disease include post-menopausal state. Past treatments include angiotensin blockers and diuretics. There are no compliance problems. Review of Systems   Constitutional: Positive for unexpected weight change (teeth pulled). Negative for chills, diaphoresis and fever. HENT:        No loss of taste or smell sensation   Respiratory: Negative for cough (no unusual) and shortness of breath. Cardiovascular: Negative for palpitations. Musculoskeletal: Negative for myalgias. Neurological: Negative for headaches.        Patient Active Problem List   Diagnosis    Goiter    Hearing loss    Hyperlipidemia    GERD (gastroesophageal reflux disease)    Essential hypertension    High serum renin    Colon polyp    Acute superficial gastritis without hemorrhage    Age-related osteoporosis without current pathological fracture    Hiatal hernia    Tobacco abuse       Past Surgical History:   Procedure Laterality Date    BREAST BIOPSY      COLONOSCOPY  2012    polyp ([address])    COLONOSCOPY  09/05/2017    repeat 5 years    HYSTERECTOMY  1990's    complete (fibroids)    THYROID SURGERY      nodule    UPPER GASTROINTESTINAL ENDOSCOPY  08/29/2017    ulcer, non bleeding    UPPER GASTROINTESTINAL ENDOSCOPY  12/06/2017       Family History   Problem Relation Age of Onset    Diabetes Sister         type 2    Breast Cancer Sister     Lung Cancer Brother         Laryngeal       Current Outpatient Medications on File Prior to Visit   Medication Sig Dispense Refill    solifenacin (VESICARE) 5 MG tablet TAKE 1 TABLET DAILY 90 tablet 3    alendronate (FOSAMAX) 70 MG tablet Take 1 tablet by mouth every 7 days 13 tablet 3    losartan (COZAAR) 50 MG tablet Take 1 tablet by mouth daily 90 tablet 1    acetaminophen (TYLENOL) 500 MG tablet Take 500 mg by mouth every 6 hours as needed for Pain      [DISCONTINUED] solifenacin (VESICARE) 5 MG tablet Take 1 tablet by mouth daily. 90 tablet 1     No current facility-administered medications on file prior to visit. Objective:         Physical Exam  Vitals signs and nursing note reviewed. Constitutional:       Appearance: She is well-developed. HENT:      Head: Normocephalic and atraumatic. Neck:      Musculoskeletal: Neck supple. Cardiovascular:      Rate and Rhythm: Normal rate and regular rhythm. Heart sounds: Normal heart sounds, S1 normal and S2 normal.   Pulmonary:      Effort: Pulmonary effort is normal. No respiratory distress. Breath sounds: Normal breath sounds. No wheezing. Skin:     General: Skin is warm and dry. Neurological:      Mental Status: She is alert. Vitals:    01/19/21 1408   BP: 122/80   Site: Left Upper Arm   Position: Sitting   Cuff Size: Medium Adult   Pulse: 67   Temp: 97.8 °F (36.6 °C)   TempSrc: Infrared   SpO2: 96%   Weight: 140 lb 12.8 oz (63.9 kg)     Body mass index is 22.73 kg/m². Wt Readings from Last 3 Encounters:   01/19/21 140 lb 12.8 oz (63.9 kg)   07/28/20 114 lb 9.6 oz (52 kg)   02/03/20 136 lb (61.7 kg)     BP Readings from Last 3 Encounters:   01/19/21 122/80   07/28/20 122/80   02/03/20 122/82          No results found for this visit on 01/19/21.   The 10-year ASCVD risk score (Thurman Boeck., et al., 2013) is: 16.7%    Values used to calculate the score:      Age: 77 years      Sex: Female      Is Non- : Yes      Diabetic: No      Tobacco smoker: Yes      Systolic Blood Pressure: 706 mmHg      Is BP treated: Yes      HDL Cholesterol: 47 mg/dL      Total Cholesterol: 185 mg/dL  Lab Review   Office Visit on 07/28/2020   Component Date Value    Sodium 07/28/2020 143     Potassium 07/28/2020 4.2     Chloride 07/28/2020 105     CO2 07/28/2020 23     Anion Gap 07/28/2020 15     Glucose 07/28/2020 109*    BUN 07/28/2020 8     CREATININE 07/28/2020 0.7     GFR Non- 07/28/2020 >60     GFR  07/28/2020 >60     Calcium 07/28/2020 9.2     Total Protein 07/28/2020 7.2     Alb 07/28/2020 4.4     Albumin/Globulin Ratio 07/28/2020 1.6     Total Bilirubin 07/28/2020 <0.2     Alkaline Phosphatase 07/28/2020 42     ALT 07/28/2020 8*    AST 07/28/2020 13*    Globulin 07/28/2020 2.8            Assessment:       Diagnosis Orders   1. Essential hypertension  atenolol (TENORMIN) 100 MG tablet    spironolactone (ALDACTONE) 50 MG tablet    Microalbumin / Creatinine Urine Ratio   2. Needs flu shot  INFLUENZA, QUADV, ADJUVANTED, 65 YRS =, IM, PF, PREFILL SYR, 0.5ML (FLUAD)   3. Encounter for screening mammogram for malignant neoplasm of breast  YAYO Screening Bilateral   4. Post-menopausal  DEXA BONE DENSITY AXIAL SKELETON           Plan:      Patient may stop her aspirin. Hypertension stable. Continue current medication. Recheck in 6 months. Return in about 25 weeks (around 7/13/2021) for In office, HTN, Fasting.

## 2021-01-19 NOTE — PATIENT INSTRUCTIONS
PLEASE BRING YOUR MEDICATIONS TO ALL APPOINTMENTS    The diagnoses and medications listed in this after visit summary may not be accurate at the time of check out. Please check MY CHART in 28-48 hours for possible corrections. Late cancellation policy: So that we can better accommodate people who are sick, please give our office 24 hour notice for an appointment cancellation. Thank you. Missed appointments: Your care is very important to us. It is important that you keep your scheduled appointments. Multiple missed appointments will lead to a dismissal from the office. Later arrival policy: If you are more than 10 minutes late for your appointment, you will be asked to reschedule. Please allow 5-7 business days for paperwork to be processed. It is important that you check your MY Chart messages, as they include appointment reminders, test results, and other important information. If you have forgotten your password, please call [phone].          1. Take your blood pressure medications at night. This reduces your chance of cardiovascular event by half  2. Fever in kids: It's best to give both Tylenol and Ibuprofen at the same time rather than staggering them which is confusing  3. Pediatric obesity is decreased by less exposure to antibiotics, consuming whole milk instead of skim milk, watching public TV instead of regular TV, and experiencing fewer adverse childhood events  4. 1 egg per day is good for your heart  5. Alternate day fasting does promote weight loss. 6. Skipping breakfast increases your risk of obesity  7. Artificially sweetened drinks increase all cause mortality (strokes, BMI, cardiovascular)  8. Kale consumption can reduce onset of dementia  9. Walking at least 8000 steps per day and resistance exercise 2-3 x per week are good for your heart  10.  Covid 19:  Wearing gloves is not that helpful  Having low vitamin D levels increases risk of infection (take 2-4000

## 2021-01-20 LAB
CREATININE URINE: 166 MG/DL (ref 28–259)
MICROALBUMIN UR-MCNC: <1.2 MG/DL
MICROALBUMIN/CREAT UR-RTO: NORMAL MG/G (ref 0–30)

## 2021-01-24 DIAGNOSIS — I10 ESSENTIAL HYPERTENSION: ICD-10-CM

## 2021-01-25 RX ORDER — LOSARTAN POTASSIUM 50 MG/1
TABLET ORAL
Qty: 90 TABLET | Refills: 3 | Status: SHIPPED | OUTPATIENT
Start: 2021-01-25 | End: 2021-08-03 | Stop reason: DRUGHIGH

## 2021-05-20 DIAGNOSIS — M81.0 AGE-RELATED OSTEOPOROSIS WITHOUT CURRENT PATHOLOGICAL FRACTURE: ICD-10-CM

## 2021-05-20 RX ORDER — ALENDRONATE SODIUM 70 MG/1
TABLET ORAL
Qty: 12 TABLET | Refills: 3 | OUTPATIENT
Start: 2021-05-20

## 2021-07-20 DIAGNOSIS — I10 ESSENTIAL HYPERTENSION: ICD-10-CM

## 2021-07-20 RX ORDER — SPIRONOLACTONE 50 MG/1
TABLET, FILM COATED ORAL
Qty: 180 TABLET | Refills: 3 | OUTPATIENT
Start: 2021-07-20

## 2021-07-20 RX ORDER — ATENOLOL 100 MG/1
TABLET ORAL
Qty: 90 TABLET | Refills: 3 | OUTPATIENT
Start: 2021-07-20

## 2021-08-03 ENCOUNTER — OFFICE VISIT (OUTPATIENT)
Dept: FAMILY MEDICINE CLINIC | Age: 66
End: 2021-08-03
Payer: MEDICARE

## 2021-08-03 VITALS
WEIGHT: 143 LBS | HEIGHT: 66 IN | SYSTOLIC BLOOD PRESSURE: 118 MMHG | DIASTOLIC BLOOD PRESSURE: 70 MMHG | BODY MASS INDEX: 22.98 KG/M2 | TEMPERATURE: 97.8 F | HEART RATE: 87 BPM

## 2021-08-03 DIAGNOSIS — R32 URINARY INCONTINENCE, UNSPECIFIED TYPE: ICD-10-CM

## 2021-08-03 DIAGNOSIS — Z23 NEED FOR SHINGLES VACCINE: ICD-10-CM

## 2021-08-03 DIAGNOSIS — M81.0 AGE-RELATED OSTEOPOROSIS WITHOUT CURRENT PATHOLOGICAL FRACTURE: ICD-10-CM

## 2021-08-03 DIAGNOSIS — I10 ESSENTIAL HYPERTENSION: Primary | ICD-10-CM

## 2021-08-03 DIAGNOSIS — H91.90 HEARING LOSS, UNSPECIFIED HEARING LOSS TYPE, UNSPECIFIED LATERALITY: ICD-10-CM

## 2021-08-03 LAB
ANION GAP SERPL CALCULATED.3IONS-SCNC: 11 MMOL/L (ref 3–16)
BUN BLDV-MCNC: 7 MG/DL (ref 7–20)
CALCIUM SERPL-MCNC: 9.5 MG/DL (ref 8.3–10.6)
CHLORIDE BLD-SCNC: 108 MMOL/L (ref 99–110)
CO2: 25 MMOL/L (ref 21–32)
CREAT SERPL-MCNC: 0.9 MG/DL (ref 0.6–1.2)
CREATININE URINE: 166.4 MG/DL (ref 28–259)
GFR AFRICAN AMERICAN: >60
GFR NON-AFRICAN AMERICAN: >60
GLUCOSE BLD-MCNC: 96 MG/DL (ref 70–99)
MICROALBUMIN UR-MCNC: <1.2 MG/DL
MICROALBUMIN/CREAT UR-RTO: NORMAL MG/G (ref 0–30)
POTASSIUM SERPL-SCNC: 3.7 MMOL/L (ref 3.5–5.1)
SODIUM BLD-SCNC: 144 MMOL/L (ref 136–145)

## 2021-08-03 PROCEDURE — 3017F COLORECTAL CA SCREEN DOC REV: CPT | Performed by: FAMILY MEDICINE

## 2021-08-03 PROCEDURE — G8420 CALC BMI NORM PARAMETERS: HCPCS | Performed by: FAMILY MEDICINE

## 2021-08-03 PROCEDURE — 0509F URINE INCON PLAN DOCD: CPT | Performed by: FAMILY MEDICINE

## 2021-08-03 PROCEDURE — 36415 COLL VENOUS BLD VENIPUNCTURE: CPT | Performed by: FAMILY MEDICINE

## 2021-08-03 PROCEDURE — 1090F PRES/ABSN URINE INCON ASSESS: CPT | Performed by: FAMILY MEDICINE

## 2021-08-03 PROCEDURE — 99214 OFFICE O/P EST MOD 30 MIN: CPT | Performed by: FAMILY MEDICINE

## 2021-08-03 PROCEDURE — 1123F ACP DISCUSS/DSCN MKR DOCD: CPT | Performed by: FAMILY MEDICINE

## 2021-08-03 PROCEDURE — 4040F PNEUMOC VAC/ADMIN/RCVD: CPT | Performed by: FAMILY MEDICINE

## 2021-08-03 PROCEDURE — G8428 CUR MEDS NOT DOCUMENT: HCPCS | Performed by: FAMILY MEDICINE

## 2021-08-03 PROCEDURE — G8399 PT W/DXA RESULTS DOCUMENT: HCPCS | Performed by: FAMILY MEDICINE

## 2021-08-03 PROCEDURE — 4004F PT TOBACCO SCREEN RCVD TLK: CPT | Performed by: FAMILY MEDICINE

## 2021-08-03 RX ORDER — SOLIFENACIN SUCCINATE 5 MG/1
5 TABLET, FILM COATED ORAL DAILY
Qty: 90 TABLET | Refills: 3 | Status: SHIPPED | OUTPATIENT
Start: 2021-08-03 | End: 2022-07-22 | Stop reason: SDUPTHER

## 2021-08-03 RX ORDER — ALENDRONATE SODIUM 70 MG/1
70 TABLET ORAL
Qty: 13 TABLET | Refills: 3 | Status: SHIPPED | OUTPATIENT
Start: 2021-08-03 | End: 2022-07-22 | Stop reason: SDUPTHER

## 2021-08-03 RX ORDER — LOSARTAN POTASSIUM 100 MG/1
100 TABLET ORAL DAILY
Qty: 90 TABLET | Refills: 0 | Status: SHIPPED | OUTPATIENT
Start: 2021-08-03 | End: 2021-10-26 | Stop reason: SDUPTHER

## 2021-08-03 RX ORDER — ATENOLOL 100 MG/1
100 TABLET ORAL DAILY
Qty: 90 TABLET | Refills: 1 | Status: SHIPPED | OUTPATIENT
Start: 2021-08-03 | End: 2022-01-18 | Stop reason: SDUPTHER

## 2021-08-03 RX ORDER — ZOSTER VACCINE RECOMBINANT, ADJUVANTED 50 MCG/0.5
0.5 KIT INTRAMUSCULAR ONCE
Qty: 0.5 ML | Refills: 0 | Status: SHIPPED | OUTPATIENT
Start: 2021-08-03 | End: 2021-08-03

## 2021-08-03 ASSESSMENT — PATIENT HEALTH QUESTIONNAIRE - PHQ9
2. FEELING DOWN, DEPRESSED OR HOPELESS: 1
SUM OF ALL RESPONSES TO PHQ QUESTIONS 1-9: 2
1. LITTLE INTEREST OR PLEASURE IN DOING THINGS: 1
SUM OF ALL RESPONSES TO PHQ QUESTIONS 1-9: 2
SUM OF ALL RESPONSES TO PHQ QUESTIONS 1-9: 2
SUM OF ALL RESPONSES TO PHQ9 QUESTIONS 1 & 2: 2

## 2021-08-03 ASSESSMENT — ENCOUNTER SYMPTOMS: SHORTNESS OF BREATH: 0

## 2021-08-03 NOTE — PATIENT INSTRUCTIONS
Need help scheduling your covid vaccine? [address] [phone]       PLEASE BRING YOUR MEDICATIONS TO ALL APPOINTMENTS    The diagnoses and medications listed in this after visit summary may not be accurate at the time of check out. Please check MY CHART in 28-48 hours for possible corrections. Late cancellation policy: So that we can better accommodate people who are sick, please give our office 24 hour notice for an appointment cancellation. Thank you. Missed appointments: Your care is very important to us. It is important that you keep your scheduled appointments. Multiple missed appointments will lead to a dismissal from the office. Later arrival policy: If you are more than 10 minutes late for your appointment, you will be asked to re-schedule. Please allow 5-7 business days for paperwork to be processed. It is important that you check your MY Chart messages, as they include appointment reminders, test results, and other important information. If you have forgotten your password, please call [phone]. HERE ARE SOME LIFE CHANGING TIPS      1. Take your blood pressure medications at bedtime. This reduces your chance of cardiovascular event by half  2. Fever in kids:  Give both Tylenol and Ibuprofen at the same time rather than staggering them which is confusing  3. Follow these tips to reduce childhood obesity: Reduce unnecessary exposure to antibiotics, consume whole milk instead of skim milk, watch public TV instead of regular TV (less exposure to junk food commercials), and reduce traumatic experiences. 4. 1 egg per day is good for your heart  5. Alternate day fasting does promote weight loss. Skipping breakfast increases your risk of obesity  6. Artificially sweetened drinks increase all cause mortality (strokes, BMI, cardiovascular)  7. Kale consumption can reduce onset of dementia  8.  Walking at least 8000 steps per day and resistance exercise 2-3 x per week are good for your heart  9.  Brushing teeth 3 times per day can decrease chance of getting diabetes

## 2021-08-03 NOTE — PROGRESS NOTES
Patient ID: Anderson Canas 1955    Chief Complaint   Patient presents with    Hypertension         Hypertension  This is a chronic problem. The current episode started more than 1 year ago. The problem is controlled. Pertinent negatives include no headaches, palpitations or shortness of breath. Risk factors for coronary artery disease include post-menopausal state. Past treatments include angiotensin blockers, diuretics and beta blockers. There are no compliance problems. Urine incontinence: Still taking Vesicare. Is not sure if it is working that well. Review of Systems   Respiratory: Negative for shortness of breath. Cardiovascular: Negative for palpitations. Neurological: Negative for headaches. Patient Active Problem List   Diagnosis    Goiter    Hearing loss    Hyperlipidemia    GERD (gastroesophageal reflux disease)    Essential hypertension    High serum renin    Colon polyp    Acute superficial gastritis without hemorrhage    Age-related osteoporosis without current pathological fracture    Hiatal hernia    Tobacco abuse       Past Surgical History:   Procedure Laterality Date    BREAST BIOPSY      COLONOSCOPY  2012    polyp ([facility])    COLONOSCOPY  09/05/2017    repeat 5 years    HYSTERECTOMY  1990's    complete (fibroids)    THYROID SURGERY      nodule    UPPER GASTROINTESTINAL ENDOSCOPY  08/29/2017    ulcer, non bleeding    UPPER GASTROINTESTINAL ENDOSCOPY  12/06/2017       Family History   Problem Relation Age of Onset    Diabetes Sister         type 2    Breast Cancer Sister     Lung Cancer Brother         Laryngeal       Current Outpatient Medications on File Prior to Visit   Medication Sig Dispense Refill    acetaminophen (TYLENOL) 500 MG tablet Take 500 mg by mouth every 6 hours as needed for Pain      [DISCONTINUED] solifenacin (VESICARE) 5 MG tablet Take 1 tablet by mouth daily.  90 tablet 1     No current facility-administered medications on file prior to visit. Objective:           Physical Exam  Vitals and nursing note reviewed. Constitutional:       Appearance: She is well-developed. HENT:      Head: Normocephalic and atraumatic. Comments: Very hard of hearing  Cardiovascular:      Rate and Rhythm: Normal rate and regular rhythm. Heart sounds: Normal heart sounds, S1 normal and S2 normal.   Pulmonary:      Effort: Pulmonary effort is normal. No respiratory distress. Breath sounds: Normal breath sounds. No wheezing. Musculoskeletal:      Cervical back: Neck supple. Skin:     General: Skin is warm and dry. Neurological:      Mental Status: She is alert. Vitals:    08/03/21 1004   BP: 118/70   Site: Left Upper Arm   Position: Sitting   Cuff Size: Medium Adult   Pulse: 87   Temp: 97.8 °F (36.6 °C)   TempSrc: Infrared   Weight: 143 lb (64.9 kg)   Height: 5' 6\" (1.676 m)     Body mass index is 23.08 kg/m². Wt Readings from Last 3 Encounters:   08/03/21 143 lb (64.9 kg)   01/19/21 140 lb 12.8 oz (63.9 kg)   07/28/20 114 lb 9.6 oz (52 kg)     BP Readings from Last 3 Encounters:   08/03/21 118/70   01/19/21 122/80   07/28/20 122/80          No results found for this visit on 08/03/21. Assessment:       Diagnosis Orders   1. Essential hypertension  Basic Metabolic Panel    atenolol (TENORMIN) 100 MG tablet    Microalbumin / Creatinine Urine Ratio   2. Hearing loss, unspecified hearing loss type, unspecified laterality     3. Need for shingles vaccine  zoster recombinant adjuvanted vaccine [facility]) 50 MCG/0.5ML SUSR injection   4. Urinary incontinence, unspecified type  solifenacin (VESICARE) 5 MG tablet   5. Age-related osteoporosis without current pathological fracture  alendronate (FOSAMAX) 70 MG tablet           Plan:      Hypertension stable. However I am concerned that she is on Aldactone which may make her urine incontinence worse.   Therefore we will stop the Aldactone and have her switch to

## 2021-08-04 ENCOUNTER — VIRTUAL VISIT (OUTPATIENT)
Dept: FAMILY MEDICINE CLINIC | Age: 66
End: 2021-08-04
Payer: MEDICARE

## 2021-08-04 DIAGNOSIS — Z00.00 ROUTINE GENERAL MEDICAL EXAMINATION AT A HEALTH CARE FACILITY: Primary | ICD-10-CM

## 2021-08-04 PROCEDURE — 4040F PNEUMOC VAC/ADMIN/RCVD: CPT | Performed by: FAMILY MEDICINE

## 2021-08-04 PROCEDURE — 1123F ACP DISCUSS/DSCN MKR DOCD: CPT | Performed by: FAMILY MEDICINE

## 2021-08-04 PROCEDURE — 3017F COLORECTAL CA SCREEN DOC REV: CPT | Performed by: FAMILY MEDICINE

## 2021-08-04 PROCEDURE — G0439 PPPS, SUBSEQ VISIT: HCPCS | Performed by: FAMILY MEDICINE

## 2021-08-04 SDOH — ECONOMIC STABILITY: FOOD INSECURITY: WITHIN THE PAST 12 MONTHS, THE FOOD YOU BOUGHT JUST DIDN'T LAST AND YOU DIDN'T HAVE MONEY TO GET MORE.: NEVER TRUE

## 2021-08-04 SDOH — ECONOMIC STABILITY: FOOD INSECURITY: WITHIN THE PAST 12 MONTHS, YOU WORRIED THAT YOUR FOOD WOULD RUN OUT BEFORE YOU GOT MONEY TO BUY MORE.: NEVER TRUE

## 2021-08-04 ASSESSMENT — LIFESTYLE VARIABLES
HOW OFTEN DO YOU HAVE SIX OR MORE DRINKS ON ONE OCCASION: 0
HOW OFTEN DURING THE LAST YEAR HAVE YOU FAILED TO DO WHAT WAS NORMALLY EXPECTED FROM YOU BECAUSE OF DRINKING: 0
HOW OFTEN DURING THE LAST YEAR HAVE YOU FOUND THAT YOU WERE NOT ABLE TO STOP DRINKING ONCE YOU HAD STARTED: 0
HOW OFTEN DO YOU HAVE A DRINK CONTAINING ALCOHOL: 1
AUDIT TOTAL SCORE: 1
HOW MANY STANDARD DRINKS CONTAINING ALCOHOL DO YOU HAVE ON A TYPICAL DAY: 0
HOW OFTEN DURING THE LAST YEAR HAVE YOU HAD A FEELING OF GUILT OR REMORSE AFTER DRINKING: 0
HOW OFTEN DURING THE LAST YEAR HAVE YOU BEEN UNABLE TO REMEMBER WHAT HAPPENED THE NIGHT BEFORE BECAUSE YOU HAD BEEN DRINKING: 0
HAVE YOU OR SOMEONE ELSE BEEN INJURED AS A RESULT OF YOUR DRINKING: 0
HAS A RELATIVE, FRIEND, DOCTOR, OR ANOTHER HEALTH PROFESSIONAL EXPRESSED CONCERN ABOUT YOUR DRINKING OR SUGGESTED YOU CUT DOWN: 0
HOW OFTEN DURING THE LAST YEAR HAVE YOU NEEDED AN ALCOHOLIC DRINK FIRST THING IN THE MORNING TO GET YOURSELF GOING AFTER A NIGHT OF HEAVY DRINKING: 0
AUDIT-C TOTAL SCORE: 1

## 2021-08-04 ASSESSMENT — SOCIAL DETERMINANTS OF HEALTH (SDOH): HOW HARD IS IT FOR YOU TO PAY FOR THE VERY BASICS LIKE FOOD, HOUSING, MEDICAL CARE, AND HEATING?: NOT HARD AT ALL

## 2021-08-04 ASSESSMENT — PATIENT HEALTH QUESTIONNAIRE - PHQ9
SUM OF ALL RESPONSES TO PHQ QUESTIONS 1-9: 2
1. LITTLE INTEREST OR PLEASURE IN DOING THINGS: 1
SUM OF ALL RESPONSES TO PHQ QUESTIONS 1-9: 2
SUM OF ALL RESPONSES TO PHQ QUESTIONS 1-9: 2
SUM OF ALL RESPONSES TO PHQ9 QUESTIONS 1 & 2: 2
2. FEELING DOWN, DEPRESSED OR HOPELESS: 1

## 2021-08-04 NOTE — PATIENT INSTRUCTIONS
Personalized Preventive Plan for Letitia Duval - 8/4/2021  Medicare offers a range of preventive health benefits. Some of the tests and screenings are paid in full while other may be subject to a deductible, co-insurance, and/or copay. Some of these benefits include a comprehensive review of your medical history including lifestyle, illnesses that may run in your family, and various assessments and screenings as appropriate. After reviewing your medical record and screening and assessments performed today your provider may have ordered immunizations, labs, imaging, and/or referrals for you. A list of these orders (if applicable) as well as your Preventive Care list are included within your After Visit Summary for your review. Other Preventive Recommendations:    · A preventive eye exam performed by an eye specialist is recommended every 1-2 years to screen for glaucoma; cataracts, macular degeneration, and other eye disorders. · A preventive dental visit is recommended every 6 months. · Try to get at least 150 minutes of exercise per week or 10,000 steps per day on a pedometer . · Order or download the FREE \"Exercise & Physical Activity: Your Everyday Guide\" from [employer]. Call 9-180.365.2242 or search [employer] online. · You need 4676-1600 mg of calcium and 2674-6141 IU of vitamin D per day. It is possible to meet your calcium requirement with diet alone, but a vitamin D supplement is usually necessary to meet this goal.  · When exposed to the sun, use a sunscreen that protects against both UVA and UVB radiation with an SPF of 30 or greater. Reapply every 2 to 3 hours or after sweating, drying off with a towel, or swimming. · Always wear a seat belt when traveling in a car. Always wear a helmet when riding a bicycle or motorcycle.

## 2021-08-04 NOTE — PROGRESS NOTES
Medicare Annual Wellness Visit  Name: Stas Mccarthy Date: 2021   MRN: [id number] Sex: Female   Age: 74 y.o. Ethnicity: Non- / Non    : 1955 Race: Black /       Quentin Cardoza is here for Medicare AWV    Screenings for behavioral, psychosocial and functional/safety risks, and cognitive dysfunction are all negative except as indicated below. These results, as well as other patient data from the [address] form, are documented in Flowsheets linked to this Encounter. Allergies   Allergen Reactions    Ace Inhibitors Other (See Comments)     cough       Prior to Visit Medications    Medication Sig Taking? Authorizing Provider   atenolol (TENORMIN) 100 MG tablet Take 1 tablet by mouth daily Yes Jamey Stanton MD   solifenacin (VESICARE) 5 MG tablet Take 1 tablet by mouth daily Yes Jamey Stanton MD   alendronate (FOSAMAX) 70 MG tablet Take 1 tablet by mouth every 7 days Yes Jamey Stanton MD   losartan (COZAAR) 100 MG tablet Take 1 tablet by mouth daily Yes Jamey Stanton MD   acetaminophen (TYLENOL) 500 MG tablet Take 500 mg by mouth every 6 hours as needed for Pain Yes Historical Provider, MD   solifenacin (VESICARE) 5 MG tablet Take 1 tablet by mouth daily.   Jamey Stanton MD       Past Medical History:   Diagnosis Date    Colon polyp 2012    Dry eyes 3/2014    Dr. Eliana Mathews     Benign    Hearing loss     very [place]    Hyperlipidemia 2002    Hypertension 2001    Impaired glucose tolerance 2013    Thyroid nodule     for bx 3/17/2016       Past Surgical History:   Procedure Laterality Date    BREAST BIOPSY      COLONOSCOPY      polyp (Dylan Reyes)    COLONOSCOPY  2017    repeat 5 years    HYSTERECTOMY      complete (fibroids)    THYROID SURGERY      nodule    UPPER GASTROINTESTINAL ENDOSCOPY  2017    ulcer, non bleeding    UPPER GASTROINTESTINAL ENDOSCOPY  2017       Family History   Problem Relation Age of Onset    Diabetes Sister         type 2    Breast Cancer Sister     Heart Disease Sister     Lung Cancer Brother         Laryngeal    Kidney Disease Mother     No Known Problems Father        CareTeam (Including outside providers/suppliers regularly involved in providing care):   Patient Care Team:  Hiram Becerra MD as PCP - General (Family Medicine)  Hiram Becerra MD as PCP - [facility] Empaneled Provider  Alivia May DO as Consulting Physician (Obstetrics & Gynecology)  Chandana Baker MD as Consulting Physician (Gastroenterology)  Berenice Hendrickson MD as Surgeon (Urology)  Mallory Mittal MD as Consulting Physician (Otolaryngology)  Clementina Rubalcava MD as Surgeon (Ophthalmology)    Wt Readings from Last 3 Encounters:   08/03/21 143 lb (64.9 kg)   01/19/21 140 lb 12.8 oz (63.9 kg)   07/28/20 114 lb 9.6 oz (52 kg)     There were no vitals filed for this visit. There is no height or weight on file to calculate BMI. Based upon direct observation of the patient, evaluation of cognition reveals recent and remote memory intact. Patient's complete Health Risk Assessment and screening values have been reviewed and are found in Flowsheets. The following problems were reviewed today and where indicated follow up appointments were made and/or referrals ordered. Positive Risk Factor Screenings with Interventions:         Substance History:  Social History     Tobacco History     Smoking Status  Current Every Day Smoker Smoking Start Date  1/1/1975 Smoking Frequency  0.5 packs/day for 46 years (23 pk yrs) Smoking Tobacco Type  Cigarettes    Smokeless Tobacco Use  Never Used          Alcohol History     Alcohol Use Status  No          Drug Use     Drug Use Status  No          Sexual Activity     Sexually Active  Not Asked               Alcohol Screening: Audit-C Score: 1  Total Score: 1    A score of 8 or more is associated with harmful or hazardous drinking.  A score of 13 or more in women, and 15 or more in men, is likely to indicate alcohol dependence. Substance Abuse Interventions:  · Tobacco abuse:  patient is not ready to work toward tobacco cessation at this time    [address] and ACP:  General  In general, how would you say your health is?: Very Good  In the past 7 days, have you experienced any of the following?  New or Increased Pain, New or Increased Fatigue, Loneliness, Social Isolation, Stress or Anger?: (!) New or Increased Pain, Stress (arthritic pain in knees)  Do you get the social and emotional support that you need?: Yes  Do you have a Living Will?: (!) No  Advance Directives     Power of  Living Will ACP-Advance Directive ACP-Power of     Not on File Not on File Not on File Not on File      General Health Risk Interventions:  · Pain issues: Patient states she spokd with PCP at office visit yesterday regarding her arthritic pain in her knees  · Stress: patient's comments regarding reasons for stress and/or anger: Patient states she has been having relationship issues which are somewhat stressful, and she just returned from [place] visiting her daughter which helped a lot  · No Living Will: Advance Care Planning addressed with patient today    Health Habits/Nutrition:  Health Habits/Nutrition  Do you exercise for at least 20 minutes 2-3 times per week?: (!) No  Have you lost any weight without trying in the past 3 months?: No  Do you eat only one meal per day?: No  Have you seen the dentist within the past year?: Yes     Health Habits/Nutrition Interventions:  · Inadequate physical activity:  patient is not ready to increase his/her physical activity level at this time    Hearing/Vision:  No exam data present  Hearing/Vision  Do you or your family notice any trouble with your hearing that hasn't been managed with hearing aids?: (!) Yes (patient states she just had an extensive hearing test, 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, can only hear certain people, mostly men, uses closed caption for TV watching-states hearing aids do not help)  Do you have difficulty driving, watching TV, or doing any of your daily activities because of your eyesight?: No  Have you had an eye exam within the past year?: (!) No  Hearing/Vision Interventions:  · Hearing concerns:  Patient states she had an extensive hearing test done while in [place] recently and 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 Eating, dressing, grooming, bathing, toileting, or walking/balance?: None  In the past 7 days, did you need help from others to take care of any of the following? Laundry, housekeeping, banking/finances, shopping, telephone use, food preparation, transportation, or taking medications?: (!) Transportation (fiance takes pt where she needs to go)  ADL Interventions:  · Patient declines any further evaluation/treatment for this issue, states her fiance takes her where she needs to go, states she will not drive due to her hearing issue.     Personalized Preventive Plan   Current Health Maintenance Status  Immunization History   Administered Date(s) Administered    Influenza Virus Vaccine 11/20/2014    Influenza, High Dose (Fluzone 65 yrs and older) 10/29/2018    Influenza, Quadv, IM, (6 mo and older Fluzone, Flulaval, Fluarix and 3 yrs and older Afluria) 08/22/2016    Influenza, Quadv, IM, PF (6 mo and older Fluzone, Flulaval, Fluarix, and 3 yrs and older Afluria) 08/16/2017    Influenza, Quadv, adjuvanted, 65 yrs +, IM, PF (Fluad) 01/19/2021    Influenza, Triv, inactivated, subunit, adjuvanted, IM (Fluad 65 yrs and older) 02/03/2020    Pneumococcal Polysaccharide ([id number]) 07/28/2020    Tdap (Boostrix, Adacel) 08/29/2012        Health Maintenance   Topic Date Due    Shingles Vaccine (1 of 2) Never done   [employer] Visit (AWV)  Never done    Breast cancer screen  11/12/2020    Flu vaccine (1) 09/01/2021    Potassium monitoring  08/03/2022    Creatinine monitoring  08/03/2022    DTaP/Tdap/Td vaccine (2 - Td or Tdap) 08/29/2022    Colon cancer screen colonoscopy  09/05/2022    Lipid screen  10/29/2023    DEXA (modify frequency per FRAX score)  Completed    Pneumococcal 65+ years Vaccine  Completed    COVID-19 Vaccine  Completed    Hepatitis C screen  Completed    Hepatitis A vaccine  Aged Out    Hepatitis B vaccine  Aged Out    Hib vaccine  Aged Out    Meningococcal (ACWY) vaccine  Aged Out     Recommendations for [employer] Due: see orders and patient instructions/AVS. Discussed need for shingles vaccinations, states PCP gave her the order for this at yesterday's office visit. Unable to obtain 3 vital signs due to patient not having equipment to take blood pressure/temperature. Recommended screening schedule for the next 5-10 years is provided to the patient in written form: see Patient Instructions/AVS.    Radha ELDRIDGE LPN, 3/1/[birth year], performed the documented evaluation under the direct supervision of the attending physician. Loren Canela, was evaluated through a synchronous (real-time) audio-video encounter. The patient (or guardian if applicable) is aware that this is a billable service. Verbal consent to proceed has been obtained within the past 12 months. The visit was conducted pursuant to the emergency declaration under the [address], [address] [place] [address] authority and the [employer] General Act. Patient identification was verified, and a caregiver was present when appropriate.  The patient was located in the [facility], where the provider was credentialed to provide care. Total time spent for this encounter: Not billed by time    --Elzie Holter, LPN on 0/0/5497 at 03:55 AM    An electronic signature was used to authenticate this note.

## 2021-10-26 ENCOUNTER — OFFICE VISIT (OUTPATIENT)
Dept: FAMILY MEDICINE CLINIC | Age: 66
End: 2021-10-26
Payer: MEDICARE

## 2021-10-26 VITALS
DIASTOLIC BLOOD PRESSURE: 96 MMHG | HEIGHT: 66 IN | HEART RATE: 69 BPM | SYSTOLIC BLOOD PRESSURE: 140 MMHG | TEMPERATURE: 97 F | BODY MASS INDEX: 22.82 KG/M2 | WEIGHT: 142 LBS

## 2021-10-26 DIAGNOSIS — G44.209 TENSION HEADACHE: ICD-10-CM

## 2021-10-26 DIAGNOSIS — Z12.31 ENCOUNTER FOR SCREENING MAMMOGRAM FOR MALIGNANT NEOPLASM OF BREAST: ICD-10-CM

## 2021-10-26 DIAGNOSIS — I10 ESSENTIAL HYPERTENSION: Primary | ICD-10-CM

## 2021-10-26 DIAGNOSIS — Z87.891 PERSONAL HISTORY OF TOBACCO USE: ICD-10-CM

## 2021-10-26 LAB
CREATININE URINE: 96.8 MG/DL (ref 28–259)
MICROALBUMIN UR-MCNC: <1.2 MG/DL
MICROALBUMIN/CREAT UR-RTO: NORMAL MG/G (ref 0–30)

## 2021-10-26 PROCEDURE — 3017F COLORECTAL CA SCREEN DOC REV: CPT | Performed by: FAMILY MEDICINE

## 2021-10-26 PROCEDURE — 4004F PT TOBACCO SCREEN RCVD TLK: CPT | Performed by: FAMILY MEDICINE

## 2021-10-26 PROCEDURE — G8399 PT W/DXA RESULTS DOCUMENT: HCPCS | Performed by: FAMILY MEDICINE

## 2021-10-26 PROCEDURE — 99214 OFFICE O/P EST MOD 30 MIN: CPT | Performed by: FAMILY MEDICINE

## 2021-10-26 PROCEDURE — 1123F ACP DISCUSS/DSCN MKR DOCD: CPT | Performed by: FAMILY MEDICINE

## 2021-10-26 PROCEDURE — G8484 FLU IMMUNIZE NO ADMIN: HCPCS | Performed by: FAMILY MEDICINE

## 2021-10-26 PROCEDURE — G8428 CUR MEDS NOT DOCUMENT: HCPCS | Performed by: FAMILY MEDICINE

## 2021-10-26 PROCEDURE — G8420 CALC BMI NORM PARAMETERS: HCPCS | Performed by: FAMILY MEDICINE

## 2021-10-26 PROCEDURE — G0296 VISIT TO DETERM LDCT ELIG: HCPCS | Performed by: FAMILY MEDICINE

## 2021-10-26 PROCEDURE — 4040F PNEUMOC VAC/ADMIN/RCVD: CPT | Performed by: FAMILY MEDICINE

## 2021-10-26 PROCEDURE — 1090F PRES/ABSN URINE INCON ASSESS: CPT | Performed by: FAMILY MEDICINE

## 2021-10-26 RX ORDER — SPIRONOLACTONE 50 MG/1
50 TABLET, FILM COATED ORAL DAILY
Qty: 90 TABLET | Refills: 0 | Status: SHIPPED | OUTPATIENT
Start: 2021-10-26 | End: 2022-01-18 | Stop reason: SDUPTHER

## 2021-10-26 RX ORDER — LOSARTAN POTASSIUM 100 MG/1
100 TABLET ORAL DAILY
Qty: 90 TABLET | Refills: 0 | Status: SHIPPED | OUTPATIENT
Start: 2021-10-26 | End: 2022-01-18 | Stop reason: SDUPTHER

## 2021-10-26 RX ORDER — SPIRONOLACTONE 50 MG/1
100 TABLET, FILM COATED ORAL DAILY
Qty: 30 TABLET | Refills: 0 | Status: SHIPPED | OUTPATIENT
Start: 2021-10-26 | End: 2021-10-26

## 2021-10-26 ASSESSMENT — ENCOUNTER SYMPTOMS: SHORTNESS OF BREATH: 0

## 2021-10-26 NOTE — PROGRESS NOTES
maPatient ID: Alicia Wilson 1955    Chief Complaint   Patient presents with    Hypertension    Headache     x 2 month         Hypertension  This is a chronic problem. The current episode started more than 1 year ago. The problem is controlled. Pertinent negatives include no headaches, palpitations or shortness of breath. Risk factors for coronary artery disease include post-menopausal state. Past treatments include angiotensin blockers, diuretics and beta blockers. There are no compliance problems. Headache   This is a new problem. Episode onset: over 2 months ago. she thinks it was when her spironolactone was stopped. The problem occurs daily. The pain is located in the occipital and vertex region. Exacerbated by: getting her last covid vaccine. She has tried acetaminophen for the symptoms. The treatment provided mild relief. Review of Systems   Respiratory: Negative for shortness of breath. Cardiovascular: Negative for palpitations. Neurological: Negative for headaches.        Patient Active Problem List   Diagnosis    Goiter    Hearing loss    Hyperlipidemia    GERD (gastroesophageal reflux disease)    Essential hypertension    High serum renin    Colon polyp    Acute superficial gastritis without hemorrhage    Age-related osteoporosis without current pathological fracture    Hiatal hernia    Tobacco abuse       Past Surgical History:   Procedure Laterality Date    BREAST BIOPSY      COLONOSCOPY  2012    polyp (Isa Longest)    COLONOSCOPY  09/05/2017    repeat 5 years    HYSTERECTOMY  1990's    complete (fibroids)    THYROID SURGERY      nodule    UPPER GASTROINTESTINAL ENDOSCOPY  08/29/2017    ulcer, non bleeding    UPPER GASTROINTESTINAL ENDOSCOPY  12/06/2017       Family History   Problem Relation Age of Onset    Diabetes Sister         type 2    Breast Cancer Sister     Heart Disease Sister     Lung Cancer Brother         Laryngeal    Kidney Disease Mother     No Known Problems Father        Current Outpatient Medications on File Prior to Visit   Medication Sig Dispense Refill    atenolol (TENORMIN) 100 MG tablet Take 1 tablet by mouth daily 90 tablet 1    solifenacin (VESICARE) 5 MG tablet Take 1 tablet by mouth daily 90 tablet 3    alendronate (FOSAMAX) 70 MG tablet Take 1 tablet by mouth every 7 days 13 tablet 3    acetaminophen (TYLENOL) 500 MG tablet Take 500 mg by mouth every 6 hours as needed for Pain      [DISCONTINUED] solifenacin (VESICARE) 5 MG tablet Take 1 tablet by mouth daily. 90 tablet 1     No current facility-administered medications on file prior to visit. Objective:           Physical Exam  Vitals and nursing note reviewed. Constitutional:       General: She is not in acute distress. Appearance: She is well-developed. HENT:      Head: Normocephalic and atraumatic. Cardiovascular:      Rate and Rhythm: Normal rate and regular rhythm. Heart sounds: Normal heart sounds, S1 normal and S2 normal.   Pulmonary:      Effort: Pulmonary effort is normal. No respiratory distress. Breath sounds: Normal breath sounds. No wheezing. Musculoskeletal:      Cervical back: Neck supple. Skin:     General: Skin is warm and dry. Neurological:      Mental Status: She is alert and oriented to person, place, and time. Comments: . Psychiatric:         Attention and Perception: She is attentive. Speech: Speech normal.         Behavior: Behavior normal.         Thought Content: Thought content normal.         Judgment: Judgment normal.       Vitals:    10/26/21 0951 10/26/21 1003   BP: (!) 148/98 (!) 140/96   Site: Left Upper Arm    Position: Sitting    Cuff Size: Medium Adult    Pulse: 69    Temp: 97 °F (36.1 °C)    TempSrc: Infrared    Weight: 142 lb (64.4 kg)    Height: 5' 6\" (1.676 m)      Body mass index is 22.92 kg/m².      Wt Readings from Last 3 Encounters:   10/26/21 142 lb (64.4 kg)   08/03/21 143 lb (64.9 kg)   01/19/21 140 lb 12.8 oz (63.9 kg)     BP Readings from Last 3 Encounters:   10/26/21 (!) 140/96   08/03/21 118/70   01/19/21 122/80          No results found for this visit on 10/26/21. Assessment:       Diagnosis Orders   1. Essential hypertension  Microalbumin / Creatinine Urine Ratio    spironolactone (ALDACTONE) 50 MG tablet    DISCONTINUED: spironolactone (ALDACTONE) 50 MG tablet   2. Encounter for screening mammogram for malignant neoplasm of breast  YAYO Screening Bilateral   3. Personal history of tobacco use  SC VISIT TO DISCUSS LUNG CA SCREEN W LDCT    CT Lung Screen (Annual)   4. Tension headache             Plan:      Blood pressure is a bit elevated. We are restarting spironolactone but at a lower dose of 50 mg once per day    We will check her hypertension again in 3 months. Headache could be related to the elevated blood pressure    Reminded her to get her mammogram    We will get CAT scan for lung cancer screening    It took an extra 5 to 10 minutes to do the visit today because of patient's hearing loss. I had to write out my questions and statements for her to understand. Return in about 12 weeks (around 1/18/2022) for HTN. Low Dose CT (LDCT) Lung Screening criteria met:     Age 55-77(Medicare) or 50-80 (USPST)   Pack year smoking >30 (Medicare) or >20 (USPSTF)   Still smoking or less than 15 year since quit   No sign or symptoms of lung cancer   > 11 months since last LDCT     Risks and benefits of lung cancer screening with LDCT scans discussed:    Significance of positive screen - False-positive LDCT results often occur. 95% of all positive results do not lead to a diagnosis of cancer. Usually further imaging can resolve most false-positive results; however, some patients may require invasive procedures.     Over diagnosis risk - 10% to 12% of screen-detected lung cancer cases are over diagnosed--that is, the cancer would not have been detected in the patient's lifetime without the screening. Need for follow up screens annually to continue lung cancer screening effectiveness     Risks associated with radiation from annual LDCT- Radiation exposure is about the same as for a mammogram, which is about 1/3 of the annual background radiation exposure from everyday life. Starting screening at age 54 is not likely to increase cancer risk from radiation exposure. Patients with comorbidities resulting in life expectancy of < 10 years, or that would preclude treatment of an abnormality identified on CT, should not be screened due to lack of benefit.     To obtain maximal benefit from this screening, smoking cessation and long-term abstinence from smoking is critical

## 2021-10-26 NOTE — PATIENT INSTRUCTIONS
PLEASE BRING YOUR MEDICATIONS TO ALL APPOINTMENTS    The diagnoses and medications listed in this after visit summary may not be accurate at the time of check out. Please check MY CHART in 28-48 hours for possible corrections. Late cancellation policy: So that we can better accommodate people who are sick, please give our office 24 hour notice for an appointment cancellation. Thank you. Missed appointments: Your care is very important to us. It is important that you keep your scheduled appointments. Multiple missed appointments will lead to a dismissal from the office. Later arrival policy: If you are more than 10 minutes late for your appointment, you will be asked to re-schedule. Please allow 5-7 business days for paperwork to be processed. It is important that you check your MY Chart messages, as they include appointment reminders, test results, and other important information. If you have forgotten your password, please call [phone]. HERE ARE SOME LIFE CHANGING TIPS      1. Take your blood pressure medications at bedtime to reduce your chance of heart attack or stroke  2. Fever in kids:  Give both Tylenol and Ibuprofen at the same time rather than staggering them   3. Follow these tips to reduce childhood obesity: Reduce unnecessary exposure to antibiotics, consume whole milk instead of skim milk, watch public TV instead of regular TV (less exposure to junk food commercials), and reduce traumatic experiences. 4. 1 egg per day is good for your heart  5. Alternate day fasting does promote weight loss. Skipping breakfast increases your risk of obesity  6. Artificially sweetened drinks increase all cause mortality (strokes, body mass index, cardiovascular disease)  7. Kale consumption can reduce onset of dementia  8. Walking at least 8000 steps per day and resistance exercise 2-3 x per week are good for your heart  9.  Brushing teeth 3 times per day can decrease chance of getting diabetes  10. Antibiotic use is associated with a lifetime increased risk of breast cancer and heart disease. What is lung cancer screening? Lung cancer screening is a way in which doctors check the lungs for early signs of cancer in people who have no symptoms of lung cancer. A low-dose CT scan uses much less radiation than a normal CT scan and shows a more detailed image of the lungs than a standard X-ray. The goal of lung cancer screening is to find cancer early, before it has a chance to grow, spread, or cause problems. One large study found that smokers who were screened with low-dose CT scans were less likely to die of lung cancer than those who were screened with standard X-ray. Below is a summary of the things you need to know regarding screening for lung cancer with low-dose computed tomography (LDCT). This is a screening program that involves routine annual screening with LDCT studies of the lung. The LDCTs are done using low-dose radiation that is not thought to increase your cancer risk. If you have other serious medical conditions (other cancers, congestive heart failure) that limit your life expectancy to less than 10 years, you should not undergo lung cancer screening with LDCT. The chance is 20%-60% that the LDCT result will show abnormalities. This would require additional testing which could include repeat imaging or even invasive procedures. Most (about 95%) of \"abnormal\" LDCT results are false in the sense that no lung cancer is ultimately found. Additionally, some (about 10%) of the cancers found would not affect your life expectancy, even if undetected and untreated. If you are still smoking, the single most important thing that you can do to reduce your risk of dying of lung cancer is to quit. For this screening to be covered by Medicare and most other insurers, strict criteria must be met.   If you do not meet these criteria, but still wish to undergo LDCT testing, you will be required to sign a waiver indicating your willingness to pay for the scan.

## 2021-11-09 ENCOUNTER — TELEPHONE (OUTPATIENT)
Dept: FAMILY MEDICINE CLINIC | Age: 66
End: 2021-11-09

## 2021-11-09 NOTE — TELEPHONE ENCOUNTER
I think I missed heard the patient but she stated she does not have the medication but she is asking what can she take for her allergies.  Please advise

## 2021-11-09 NOTE — TELEPHONE ENCOUNTER
Patient called stating that her allergies are starting to act up really bad, patient stated she has gotten deslorelin OTC but the back of the box says check with PCP if she has high blood pleasure. Patient would like to know if she can take this medication.  Please advise     Thank you

## 2021-11-09 NOTE — TELEPHONE ENCOUNTER
I believe the medication has been mis- spelled. Should be ok to take as long as there is no decongestant attached to it.

## 2021-11-19 ENCOUNTER — HOSPITAL ENCOUNTER (OUTPATIENT)
Dept: CT IMAGING | Age: 66
Discharge: HOME | End: 2021-11-19
Payer: MEDICARE

## 2021-11-19 ENCOUNTER — HOSPITAL ENCOUNTER (OUTPATIENT)
Dept: WOMENS IMAGING | Age: 66
Discharge: HOME | End: 2021-11-19
Payer: MEDICARE

## 2021-11-19 DIAGNOSIS — Z87.891 PERSONAL HISTORY OF TOBACCO USE: ICD-10-CM

## 2021-11-19 DIAGNOSIS — Z12.31 ENCOUNTER FOR SCREENING MAMMOGRAM FOR MALIGNANT NEOPLASM OF BREAST: ICD-10-CM

## 2021-11-19 PROCEDURE — 77067 SCR MAMMO BI INCL CAD: CPT

## 2021-11-19 PROCEDURE — 71271 CT THORAX LUNG CANCER SCR C-: CPT

## 2021-12-01 ENCOUNTER — TELEPHONE (OUTPATIENT)
Dept: FAMILY MEDICINE CLINIC | Age: 66
End: 2021-12-01

## 2021-12-01 DIAGNOSIS — E04.9 GOITER: Primary | ICD-10-CM

## 2021-12-01 NOTE — TELEPHONE ENCOUNTER
(patient is hard of hearing)    Let her know that her CT lung did not show any lung cancer. It did show that she has the enlarged thyroid on the right side that we knew about previously. Let her know I'd like to do another ultrasound just to better check on the goiter and to make sure it's not growing in size. Printed.

## 2021-12-16 ENCOUNTER — HOSPITAL ENCOUNTER (OUTPATIENT)
Dept: ULTRASOUND IMAGING | Age: 66
Discharge: HOME | End: 2021-12-16
Payer: MEDICARE

## 2021-12-16 DIAGNOSIS — E04.9 GOITER: ICD-10-CM

## 2021-12-16 PROCEDURE — 76536 US EXAM OF HEAD AND NECK: CPT

## 2022-01-14 DIAGNOSIS — I10 ESSENTIAL HYPERTENSION: ICD-10-CM

## 2022-01-14 RX ORDER — ATENOLOL 100 MG/1
TABLET ORAL
Qty: 90 TABLET | Refills: 3 | OUTPATIENT
Start: 2022-01-14

## 2022-01-18 ENCOUNTER — HOSPITAL ENCOUNTER (OUTPATIENT)
Age: 67
Setting detail: SPECIMEN
Discharge: HOME | End: 2022-01-18
Payer: MEDICARE

## 2022-01-18 ENCOUNTER — OFFICE VISIT (OUTPATIENT)
Dept: FAMILY MEDICINE CLINIC | Age: 67
End: 2022-01-18
Payer: MEDICARE

## 2022-01-18 VITALS
WEIGHT: 146.8 LBS | DIASTOLIC BLOOD PRESSURE: 80 MMHG | HEART RATE: 61 BPM | BODY MASS INDEX: 23.59 KG/M2 | OXYGEN SATURATION: 97 % | HEIGHT: 66 IN | SYSTOLIC BLOOD PRESSURE: 120 MMHG | TEMPERATURE: 97 F

## 2022-01-18 DIAGNOSIS — I10 ESSENTIAL HYPERTENSION: Primary | ICD-10-CM

## 2022-01-18 DIAGNOSIS — Z23 NEEDS FLU SHOT: ICD-10-CM

## 2022-01-18 DIAGNOSIS — R09.82 POST-NASAL DRAINAGE: ICD-10-CM

## 2022-01-18 PROCEDURE — 1090F PRES/ABSN URINE INCON ASSESS: CPT | Performed by: FAMILY MEDICINE

## 2022-01-18 PROCEDURE — U0005 INFEC AGEN DETEC AMPLI PROBE: HCPCS

## 2022-01-18 PROCEDURE — 3017F COLORECTAL CA SCREEN DOC REV: CPT | Performed by: FAMILY MEDICINE

## 2022-01-18 PROCEDURE — U0003 INFECTIOUS AGENT DETECTION BY NUCLEIC ACID (DNA OR RNA); SEVERE ACUTE RESPIRATORY SYNDROME CORONAVIRUS 2 (SARS-COV-2) (CORONAVIRUS DISEASE [COVID-19]), AMPLIFIED PROBE TECHNIQUE, MAKING USE OF HIGH THROUGHPUT TECHNOLOGIES AS DESCRIBED BY CMS-2020-01-R: HCPCS

## 2022-01-18 PROCEDURE — G8484 FLU IMMUNIZE NO ADMIN: HCPCS | Performed by: FAMILY MEDICINE

## 2022-01-18 PROCEDURE — 1123F ACP DISCUSS/DSCN MKR DOCD: CPT | Performed by: FAMILY MEDICINE

## 2022-01-18 PROCEDURE — 90694 VACC AIIV4 NO PRSRV 0.5ML IM: CPT | Performed by: FAMILY MEDICINE

## 2022-01-18 PROCEDURE — G0008 ADMIN INFLUENZA VIRUS VAC: HCPCS | Performed by: FAMILY MEDICINE

## 2022-01-18 PROCEDURE — 99214 OFFICE O/P EST MOD 30 MIN: CPT | Performed by: FAMILY MEDICINE

## 2022-01-18 PROCEDURE — 4004F PT TOBACCO SCREEN RCVD TLK: CPT | Performed by: FAMILY MEDICINE

## 2022-01-18 PROCEDURE — 4040F PNEUMOC VAC/ADMIN/RCVD: CPT | Performed by: FAMILY MEDICINE

## 2022-01-18 PROCEDURE — G8427 DOCREV CUR MEDS BY ELIG CLIN: HCPCS | Performed by: FAMILY MEDICINE

## 2022-01-18 PROCEDURE — G8420 CALC BMI NORM PARAMETERS: HCPCS | Performed by: FAMILY MEDICINE

## 2022-01-18 PROCEDURE — G8399 PT W/DXA RESULTS DOCUMENT: HCPCS | Performed by: FAMILY MEDICINE

## 2022-01-18 RX ORDER — SPIRONOLACTONE 50 MG/1
50 TABLET, FILM COATED ORAL DAILY
Qty: 90 TABLET | Refills: 1 | Status: SHIPPED | OUTPATIENT
Start: 2022-01-18 | End: 2022-08-05 | Stop reason: DRUGHIGH

## 2022-01-18 RX ORDER — ATENOLOL 100 MG/1
100 TABLET ORAL DAILY
Qty: 90 TABLET | Refills: 1 | Status: SHIPPED | OUTPATIENT
Start: 2022-01-18 | End: 2022-07-22 | Stop reason: SDUPTHER

## 2022-01-18 RX ORDER — LOSARTAN POTASSIUM 100 MG/1
100 TABLET ORAL DAILY
Qty: 90 TABLET | Refills: 1 | Status: SHIPPED | OUTPATIENT
Start: 2022-01-18 | End: 2022-07-22 | Stop reason: SDUPTHER

## 2022-01-18 ASSESSMENT — ENCOUNTER SYMPTOMS: SHORTNESS OF BREATH: 0

## 2022-01-18 NOTE — PROGRESS NOTES
After obtaining informed consent, the immunizationbFlu Fluad vaccine   left Deltoid . is given by TAMMY Jacobs,  Patient tolerated well.

## 2022-01-18 NOTE — PROGRESS NOTES
Patient ID: Alexandria Laguerre 1955    Chief Complaint   Patient presents with    Hypertension         Hypertension  This is a chronic problem. The current episode started more than 1 year ago. The problem is controlled. Pertinent negatives include no palpitations or shortness of breath. Risk factors for coronary artery disease include post-menopausal state. Past treatments include angiotensin blockers, diuretics and beta blockers. There are no compliance problems. Nasal drainage: Ongoing for 2 months. This happens every year. She does not feel like she has COVID. She recently canceled a trip to see family because of high COVID  predominance    Review of Systems   Respiratory: Negative for shortness of breath. Cardiovascular: Negative for palpitations.        Patient Active Problem List   Diagnosis    Goiter    Hearing loss    Hyperlipidemia    GERD (gastroesophageal reflux disease)    Essential hypertension    High serum renin    Colon polyp    Acute superficial gastritis without hemorrhage    Age-related osteoporosis without current pathological fracture    Hiatal hernia    Tobacco abuse       Past Surgical History:   Procedure Laterality Date    BREAST BIOPSY      COLONOSCOPY  2012    polyp (Alla Fried)    COLONOSCOPY  09/05/2017    repeat 5 years    HYSTERECTOMY  1990's    complete (fibroids)    OVARY REMOVAL      THYROID SURGERY      nodule    UPPER GASTROINTESTINAL ENDOSCOPY  08/29/2017    ulcer, non bleeding    UPPER GASTROINTESTINAL ENDOSCOPY  12/06/2017       Family History   Problem Relation Age of Onset    Diabetes Sister         type 2    Breast Cancer Sister     Heart Disease Sister     Lung Cancer Brother         Laryngeal    Kidney Disease Mother     No Known Problems Father        Current Outpatient Medications on File Prior to Visit   Medication Sig Dispense Refill    solifenacin (VESICARE) 5 MG tablet Take 1 tablet by mouth daily 90 tablet 3    alendronate (FOSAMAX) 70 MG tablet Take 1 tablet by mouth every 7 days 13 tablet 3    acetaminophen (TYLENOL) 500 MG tablet Take 500 mg by mouth every 6 hours as needed for Pain      [DISCONTINUED] solifenacin (VESICARE) 5 MG tablet Take 1 tablet by mouth daily. 90 tablet 1     No current facility-administered medications on file prior to visit. Objective:           Physical Exam  Vitals and nursing note reviewed. Constitutional:       Appearance: She is well-developed. HENT:      Head: Normocephalic and atraumatic. Neck:      Thyroid: Thyromegaly present. Cardiovascular:      Rate and Rhythm: Normal rate and regular rhythm. Heart sounds: Normal heart sounds, S1 normal and S2 normal.   Pulmonary:      Effort: Pulmonary effort is normal. No respiratory distress. Breath sounds: Normal breath sounds. No wheezing. Musculoskeletal:      Cervical back: Neck supple. Skin:     General: Skin is warm and dry. Neurological:      Mental Status: She is alert. Vitals:    01/18/22 1030   BP: 120/80   Site: Left Upper Arm   Position: Sitting   Cuff Size: Medium Adult   Pulse: 61   Temp: 97 °F (36.1 °C)   TempSrc: Infrared   SpO2: 97%   Weight: 146 lb 12.8 oz (66.6 kg)   Height: 5' 6\" (1.676 m)     Body mass index is 23.69 kg/m². Wt Readings from Last 3 Encounters:   01/18/22 146 lb 12.8 oz (66.6 kg)   10/26/21 142 lb (64.4 kg)   08/03/21 143 lb (64.9 kg)     BP Readings from Last 3 Encounters:   01/18/22 120/80   10/26/21 (!) 140/96   08/03/21 118/70      1. Unchanged TI-RADS TR3 nodules fill much of the moderately enlarged right   thyroid lobe and isthmus with prior benign biopsy of the largest nodule.  No   additional follow-up is recommended as below.    2. Atrophic left lobe likely due to prior partial resection.  An associated   0.8 cm TI-RADS TR1 nodule is likely a benign colloid cyst and requires no   dedicated follow-up as below.       RECOMMENDATIONS:   ACR TI-RADS recommendations     TR3 (3 points):  FNA if >= 2.5 cm; follow-up if 1.5-2.4 cm in 1, 3, and 5   years (completed)             No results found for this visit on 01/18/22. Assessment:       Diagnosis Orders   1. Essential hypertension  atenolol (TENORMIN) 100 MG tablet    spironolactone (ALDACTONE) 50 MG tablet   2. Needs flu shot  INFLUENZA, QUADV, ADJUVANTED, 65 YRS =, IM, PF, PREFILL SYR, 0.5ML (FLUAD)   3. Post-nasal drainage  COVID-19    COVID-19           Plan:      HTN stable. Continue atenolol and spironolactone    Return in about 6 months (around 7/18/2022) for HTN.

## 2022-01-18 NOTE — PATIENT INSTRUCTIONS
PLEASE BRING YOUR MEDICATIONS TO ALL APPOINTMENTS    The diagnoses and medications listed in this after visit summary may not be accurate at the time of check out. Please check MY CHART in 28-48 hours for possible corrections. Late cancellation policy: So that we can better accommodate people who are sick, please give our office 24 hour notice for an appointment cancellation. Missed appointments: Your care is very important to us. It is important that you keep your scheduled appointments. Multiple missed appointments will lead to a dismissal from the office. Patients arriving late will be worked into the schedule as time permits, with patients arriving on time taken as scheduled. Late arriving patients are more than welcome to wait or reschedule their appointments. Please allow 5-7 business days for paperwork to be processed. It is important that you check your MY Chart messages, as they include appointment reminders, test results, and other important information. If you have forgotten your password, please call [phone]. HERE ARE SOME LIFE CHANGING TIPS      1. Take your blood pressure medications at bedtime to reduce your chance of heart attack or stroke  2. Fever in kids:  Give both Tylenol and Ibuprofen at the same time rather than staggering them   3. Follow these tips to reduce childhood obesity: Reduce unnecessary exposure to antibiotics, consume whole milk instead of skim milk, watch public TV instead of regular TV (less exposure to junk food commercials), and reduce traumatic experiences. 4. 1 egg per day is good for your heart  5. Alternate day fasting does promote weight loss. Skipping breakfast increases your risk of obesity  6. Artificially sweetened drinks increase all cause mortality (strokes, body mass index, cardiovascular disease)  7. Kale consumption can reduce onset of dementia  8.  Walking at least 8000 steps per day and resistance exercise 2-3 x per week are good for your heart  9. Brushing teeth 3 times per day can decrease chance of getting diabetes  10. Antibiotic use is associated with a lifetime increased risk of breast cancer and heart disease.

## 2022-01-19 LAB
SARS-COV-2: NOT DETECTED
SOURCE: NORMAL

## 2022-01-21 ENCOUNTER — TELEPHONE (OUTPATIENT)
Dept: FAMILY MEDICINE CLINIC | Age: 67
End: 2022-01-21

## 2022-07-13 DIAGNOSIS — I10 ESSENTIAL HYPERTENSION: ICD-10-CM

## 2022-07-13 RX ORDER — ATENOLOL 100 MG/1
TABLET ORAL
Qty: 90 TABLET | Refills: 3 | OUTPATIENT
Start: 2022-07-13

## 2022-07-18 DIAGNOSIS — I10 ESSENTIAL HYPERTENSION: ICD-10-CM

## 2022-07-18 RX ORDER — SPIRONOLACTONE 50 MG/1
TABLET, FILM COATED ORAL
Qty: 90 TABLET | Refills: 3 | OUTPATIENT
Start: 2022-07-18

## 2022-07-18 RX ORDER — LOSARTAN POTASSIUM 100 MG/1
TABLET ORAL
Qty: 90 TABLET | Refills: 3 | OUTPATIENT
Start: 2022-07-18

## 2022-07-22 ENCOUNTER — OFFICE VISIT (OUTPATIENT)
Dept: FAMILY MEDICINE CLINIC | Age: 67
End: 2022-07-22
Payer: MEDICARE

## 2022-07-22 VITALS
HEART RATE: 75 BPM | BODY MASS INDEX: 24.11 KG/M2 | WEIGHT: 150 LBS | SYSTOLIC BLOOD PRESSURE: 162 MMHG | HEIGHT: 66 IN | DIASTOLIC BLOOD PRESSURE: 92 MMHG

## 2022-07-22 DIAGNOSIS — I10 ESSENTIAL HYPERTENSION: Primary | ICD-10-CM

## 2022-07-22 DIAGNOSIS — H91.90 HEARING LOSS, UNSPECIFIED HEARING LOSS TYPE, UNSPECIFIED LATERALITY: ICD-10-CM

## 2022-07-22 DIAGNOSIS — M81.0 AGE-RELATED OSTEOPOROSIS WITHOUT CURRENT PATHOLOGICAL FRACTURE: ICD-10-CM

## 2022-07-22 DIAGNOSIS — R32 URINARY INCONTINENCE, UNSPECIFIED TYPE: ICD-10-CM

## 2022-07-22 LAB
A/G RATIO: 1.6 (ref 1.1–2.2)
ALBUMIN SERPL-MCNC: 4.2 G/DL (ref 3.4–5)
ALP BLD-CCNC: 46 U/L (ref 40–129)
ALT SERPL-CCNC: 7 U/L (ref 10–40)
ANION GAP SERPL CALCULATED.3IONS-SCNC: 9 MMOL/L (ref 3–16)
AST SERPL-CCNC: 14 U/L (ref 15–37)
BILIRUB SERPL-MCNC: 0.4 MG/DL (ref 0–1)
BUN BLDV-MCNC: 9 MG/DL (ref 7–20)
CALCIUM SERPL-MCNC: 9.2 MG/DL (ref 8.3–10.6)
CHLORIDE BLD-SCNC: 103 MMOL/L (ref 99–110)
CHOLESTEROL, TOTAL: 244 MG/DL (ref 0–199)
CO2: 28 MMOL/L (ref 21–32)
CREAT SERPL-MCNC: 0.8 MG/DL (ref 0.6–1.2)
CREATININE URINE: 93.1 MG/DL (ref 28–259)
GFR AFRICAN AMERICAN: >60
GFR NON-AFRICAN AMERICAN: >60
GLUCOSE BLD-MCNC: 92 MG/DL (ref 70–99)
HDLC SERPL-MCNC: 43 MG/DL (ref 40–60)
LDL CHOLESTEROL CALCULATED: 169 MG/DL
MICROALBUMIN UR-MCNC: <1.2 MG/DL
MICROALBUMIN/CREAT UR-RTO: NORMAL MG/G (ref 0–30)
POTASSIUM SERPL-SCNC: 3.9 MMOL/L (ref 3.5–5.1)
SODIUM BLD-SCNC: 140 MMOL/L (ref 136–145)
TOTAL PROTEIN: 6.8 G/DL (ref 6.4–8.2)
TRIGL SERPL-MCNC: 159 MG/DL (ref 0–150)
VLDLC SERPL CALC-MCNC: 32 MG/DL

## 2022-07-22 PROCEDURE — 36415 COLL VENOUS BLD VENIPUNCTURE: CPT | Performed by: FAMILY MEDICINE

## 2022-07-22 PROCEDURE — 99214 OFFICE O/P EST MOD 30 MIN: CPT | Performed by: FAMILY MEDICINE

## 2022-07-22 PROCEDURE — 0509F URINE INCON PLAN DOCD: CPT | Performed by: FAMILY MEDICINE

## 2022-07-22 PROCEDURE — G8399 PT W/DXA RESULTS DOCUMENT: HCPCS | Performed by: FAMILY MEDICINE

## 2022-07-22 PROCEDURE — G8420 CALC BMI NORM PARAMETERS: HCPCS | Performed by: FAMILY MEDICINE

## 2022-07-22 PROCEDURE — 1123F ACP DISCUSS/DSCN MKR DOCD: CPT | Performed by: FAMILY MEDICINE

## 2022-07-22 PROCEDURE — 1090F PRES/ABSN URINE INCON ASSESS: CPT | Performed by: FAMILY MEDICINE

## 2022-07-22 PROCEDURE — 4004F PT TOBACCO SCREEN RCVD TLK: CPT | Performed by: FAMILY MEDICINE

## 2022-07-22 PROCEDURE — G8427 DOCREV CUR MEDS BY ELIG CLIN: HCPCS | Performed by: FAMILY MEDICINE

## 2022-07-22 PROCEDURE — 3017F COLORECTAL CA SCREEN DOC REV: CPT | Performed by: FAMILY MEDICINE

## 2022-07-22 RX ORDER — ATENOLOL 100 MG/1
100 TABLET ORAL DAILY
Qty: 90 TABLET | Refills: 1 | Status: SHIPPED | OUTPATIENT
Start: 2022-07-22

## 2022-07-22 RX ORDER — ALENDRONATE SODIUM 70 MG/1
70 TABLET ORAL
Qty: 13 TABLET | Refills: 3 | Status: SHIPPED | OUTPATIENT
Start: 2022-07-22

## 2022-07-22 RX ORDER — SOLIFENACIN SUCCINATE 5 MG/1
5 TABLET, FILM COATED ORAL DAILY
Qty: 90 TABLET | Refills: 3 | Status: SHIPPED | OUTPATIENT
Start: 2022-07-22

## 2022-07-22 RX ORDER — LOSARTAN POTASSIUM 100 MG/1
100 TABLET ORAL DAILY
Qty: 90 TABLET | Refills: 1 | Status: SHIPPED | OUTPATIENT
Start: 2022-07-22

## 2022-07-22 ASSESSMENT — PATIENT HEALTH QUESTIONNAIRE - PHQ9
SUM OF ALL RESPONSES TO PHQ QUESTIONS 1-9: 0
SUM OF ALL RESPONSES TO PHQ9 QUESTIONS 1 & 2: 0
SUM OF ALL RESPONSES TO PHQ QUESTIONS 1-9: 0
2. FEELING DOWN, DEPRESSED OR HOPELESS: 0
SUM OF ALL RESPONSES TO PHQ QUESTIONS 1-9: 0
1. LITTLE INTEREST OR PLEASURE IN DOING THINGS: 0
SUM OF ALL RESPONSES TO PHQ QUESTIONS 1-9: 0

## 2022-07-22 ASSESSMENT — ENCOUNTER SYMPTOMS: SHORTNESS OF BREATH: 0

## 2022-07-22 NOTE — PROGRESS NOTES
Patient ID: Adri Barcenas 1955    . Chief Complaint   Patient presents with    Hypertension         Hypertension  This is a chronic problem. The current episode started more than 1 year ago. The problem is controlled. Pertinent negatives include no palpitations or shortness of breath. Risk factors for coronary artery disease include post-menopausal state. Past treatments include angiotensin blockers, diuretics and beta blockers. There are no compliance problems. Urinary incontinence: Still taking her Vesicare    Osteoporosis: Still taking her Fosamax. Review of Systems   Respiratory:  Negative for shortness of breath. Cardiovascular:  Negative for palpitations.      Patient Active Problem List   Diagnosis    Goiter    Hearing loss    Hyperlipidemia    GERD (gastroesophageal reflux disease)    Essential hypertension    High serum renin    Colon polyp    Acute superficial gastritis without hemorrhage    Age-related osteoporosis without current pathological fracture    Hiatal hernia    Tobacco abuse       Past Surgical History:   Procedure Laterality Date    BREAST BIOPSY      COLONOSCOPY  2012    polyp ([facility] [address])    COLONOSCOPY  09/05/2017    repeat 5 years    HYSTERECTOMY (CERVIX STATUS UNKNOWN)  1990's    complete (fibroids)    OVARY REMOVAL      THYROID SURGERY      nodule    UPPER GASTROINTESTINAL ENDOSCOPY  08/29/2017    ulcer, non bleeding    UPPER GASTROINTESTINAL ENDOSCOPY  12/06/2017       Family History   Problem Relation Age of Onset    Diabetes Sister         type 2    Breast Cancer Sister     Heart Disease Sister     Lung Cancer Brother         Laryngeal    Kidney Disease Mother     No Known Problems Father        Current Outpatient Medications on File Prior to Visit   Medication Sig Dispense Refill    polyethyl glycol-propyl glycol 0.4-0.3 % (SYSTANE) 0.4-0.3 % ophthalmic solution Place 1 drop into both eyes in the morning, at noon, and at bedtime      spironolactone (ALDACTONE) 50 MG tablet Take 1 tablet by mouth daily 90 tablet 1    acetaminophen (TYLENOL) 500 MG tablet Take 500 mg by mouth every 6 hours as needed for Pain      [DISCONTINUED] solifenacin (VESICARE) 5 MG tablet Take 1 tablet by mouth daily. 90 tablet 1     No current facility-administered medications on file prior to visit. Objective:         Physical Exam  Vitals and nursing note reviewed. Constitutional:       Appearance: She is well-developed. HENT:      Head: Normocephalic and atraumatic. Comments: Very hard of hearing  Cardiovascular:      Rate and Rhythm: Normal rate and regular rhythm. Heart sounds: Normal heart sounds, S1 normal and S2 normal.   Pulmonary:      Effort: Pulmonary effort is normal. No respiratory distress. Breath sounds: Normal breath sounds. No wheezing. Musculoskeletal:      Cervical back: Neck supple. Right lower leg: No edema. Left lower leg: No edema. Skin:     General: Skin is warm and dry. Neurological:      Mental Status: She is alert. Vitals:    07/22/22 1027 07/22/22 1032   BP: (!) 160/92 (!) 162/92   Site: Left Upper Arm Right Upper Arm   Position: Sitting Sitting   Cuff Size: Medium Adult Medium Adult   Pulse: 75    Weight: 150 lb (68 kg)    Height: 5' 6\" (1.676 m)      Body mass index is 24.21 kg/m². Wt Readings from Last 3 Encounters:   07/22/22 150 lb (68 kg)   01/18/22 146 lb 12.8 oz (66.6 kg)   10/26/21 142 lb (64.4 kg)     BP Readings from Last 3 Encounters:   07/22/22 (!) 162/92   01/18/22 120/80   10/26/21 (!) 140/96          No results found for this visit on 07/22/22. The ASCVD Risk score (Selina Villa., et al., 2013) failed to calculate for the following reasons:    Cannot find a previous HDL lab    Cannot find a previous total cholesterol lab  Lab Review   No visits with results within 2 Month(s) from this visit.    Latest known visit with results is:   Hospital Outpatient Visit on 01/18/2022   Component Date Value    Source 01/18/2022 THROAT     SARS-CoV-2 01/18/2022 NOT DETECTED            Assessment:       Diagnosis Orders   1. Essential hypertension  Comprehensive Metabolic Panel    Microalbumin / Creatinine Urine Ratio    Lipid Panel    atenolol (TENORMIN) 100 MG tablet      2. Urinary incontinence, unspecified type  solifenacin (VESICARE) 5 MG tablet      3. Age-related osteoporosis without current pathological fracture  alendronate (FOSAMAX) 70 MG tablet      4. Hearing loss, unspecified hearing loss type, unspecified laterality                Plan:      Hypertension uncontrolled. Will increase her spironolactone from 50mg to 100 mg. Since she has a lot of 50 mg spironolactone's right now, will not send a new prescription until she is reevaluated in 2 weeks. Continue the Vesicare the urine incontinence    Continue the Fosamax for the osteoporosis    Patient is hard of hearing makes it difficult to communicate. I had to repeat myself a lot. Return in about 2 weeks (around 8/5/2022) for HTN rm 6.

## 2022-08-05 ENCOUNTER — OFFICE VISIT (OUTPATIENT)
Dept: FAMILY MEDICINE CLINIC | Age: 67
End: 2022-08-05
Payer: MEDICARE

## 2022-08-05 VITALS
HEART RATE: 71 BPM | HEIGHT: 66 IN | BODY MASS INDEX: 23.78 KG/M2 | WEIGHT: 148 LBS | SYSTOLIC BLOOD PRESSURE: 138 MMHG | DIASTOLIC BLOOD PRESSURE: 88 MMHG

## 2022-08-05 DIAGNOSIS — I10 ESSENTIAL HYPERTENSION: Primary | ICD-10-CM

## 2022-08-05 DIAGNOSIS — Z86.010 HISTORY OF COLON POLYPS: ICD-10-CM

## 2022-08-05 PROBLEM — Z86.0100 HISTORY OF COLON POLYPS: Status: ACTIVE | Noted: 2022-08-05

## 2022-08-05 PROCEDURE — 4004F PT TOBACCO SCREEN RCVD TLK: CPT | Performed by: FAMILY MEDICINE

## 2022-08-05 PROCEDURE — G8399 PT W/DXA RESULTS DOCUMENT: HCPCS | Performed by: FAMILY MEDICINE

## 2022-08-05 PROCEDURE — 99213 OFFICE O/P EST LOW 20 MIN: CPT | Performed by: FAMILY MEDICINE

## 2022-08-05 PROCEDURE — 1090F PRES/ABSN URINE INCON ASSESS: CPT | Performed by: FAMILY MEDICINE

## 2022-08-05 PROCEDURE — G8420 CALC BMI NORM PARAMETERS: HCPCS | Performed by: FAMILY MEDICINE

## 2022-08-05 PROCEDURE — 1123F ACP DISCUSS/DSCN MKR DOCD: CPT | Performed by: FAMILY MEDICINE

## 2022-08-05 PROCEDURE — 3017F COLORECTAL CA SCREEN DOC REV: CPT | Performed by: FAMILY MEDICINE

## 2022-08-05 PROCEDURE — G8427 DOCREV CUR MEDS BY ELIG CLIN: HCPCS | Performed by: FAMILY MEDICINE

## 2022-08-05 RX ORDER — SPIRONOLACTONE 100 MG/1
100 TABLET, FILM COATED ORAL DAILY
Qty: 90 TABLET | Refills: 1 | Status: SHIPPED | OUTPATIENT
Start: 2022-08-05

## 2022-08-05 ASSESSMENT — ENCOUNTER SYMPTOMS: SHORTNESS OF BREATH: 0

## 2022-08-05 NOTE — PROGRESS NOTES
Patient ID: Jacqueline Isaacs 1955    Chief Complaint   Patient presents with    Hypertension         Hypertension  This is a chronic problem. The current episode started more than 1 year ago. The problem has been rapidly improving since onset. The problem is controlled. Pertinent negatives include no palpitations or shortness of breath. Risk factors for coronary artery disease include post-menopausal state. Past treatments include angiotensin blockers, diuretics and beta blockers. There are no compliance problems. Review of Systems   Respiratory:  Negative for shortness of breath. Cardiovascular:  Negative for palpitations.      Patient Active Problem List   Diagnosis    Goiter    Hearing loss    Hyperlipidemia    GERD (gastroesophageal reflux disease)    Essential hypertension    High serum renin    Colon polyp    Acute superficial gastritis without hemorrhage    Age-related osteoporosis without current pathological fracture    Hiatal hernia    Tobacco abuse    History of colon polyps       Past Surgical History:   Procedure Laterality Date    BREAST BIOPSY      COLONOSCOPY  2012    polyp ([facility])    COLONOSCOPY  09/05/2017    repeat 5 years    HYSTERECTOMY (CERVIX STATUS UNKNOWN)  1990's    complete (fibroids)    OVARY REMOVAL      THYROID SURGERY      nodule    UPPER GASTROINTESTINAL ENDOSCOPY  08/29/2017    ulcer, non bleeding    UPPER GASTROINTESTINAL ENDOSCOPY  12/06/2017       Family History   Problem Relation Age of Onset    Diabetes Sister         type 2    Breast Cancer Sister     Heart Disease Sister     Lung Cancer Brother         Laryngeal    Kidney Disease Mother     No Known Problems Father        Current Outpatient Medications on File Prior to Visit   Medication Sig Dispense Refill    polyethyl glycol-propyl glycol 0.4-0.3 % (SYSTANE) 0.4-0.3 % ophthalmic solution Place 1 drop into both eyes in the morning, at noon, and at bedtime      atenolol (TENORMIN) 100 MG tablet Take 1 tablet by mouth in the morning. 90 tablet 1    losartan (COZAAR) 100 MG tablet Take 1 tablet by mouth in the morning. 90 tablet 1    solifenacin (VESICARE) 5 MG tablet Take 1 tablet by mouth in the morning. 90 tablet 3    alendronate (FOSAMAX) 70 MG tablet Take 1 tablet by mouth every 7 days 13 tablet 3    acetaminophen (TYLENOL) 500 MG tablet Take 500 mg by mouth every 6 hours as needed for Pain (Patient not taking: Reported on 8/5/2022)      [DISCONTINUED] solifenacin (VESICARE) 5 MG tablet Take 1 tablet by mouth daily. 90 tablet 1     No current facility-administered medications on file prior to visit. Objective:           Physical Exam  Vitals and nursing note reviewed. Constitutional:       Appearance: She is well-developed. HENT:      Head: Normocephalic and atraumatic. Cardiovascular:      Rate and Rhythm: Normal rate and regular rhythm. Heart sounds: Normal heart sounds, S1 normal and S2 normal.   Pulmonary:      Effort: Pulmonary effort is normal. No respiratory distress. Breath sounds: Normal breath sounds. No wheezing. Musculoskeletal:      Cervical back: Neck supple. Skin:     General: Skin is warm and dry. Neurological:      Mental Status: She is alert. Vitals:    08/05/22 0949   BP: 138/88   Site: Left Upper Arm   Position: Sitting   Cuff Size: Medium Adult   Pulse: 71   Weight: 148 lb (67.1 kg)   Height: 5' 6\" (1.676 m)     Body mass index is 23.89 kg/m². Wt Readings from Last 3 Encounters:   08/05/22 148 lb (67.1 kg)   07/22/22 150 lb (68 kg)   01/18/22 146 lb 12.8 oz (66.6 kg)     BP Readings from Last 3 Encounters:   08/05/22 138/88   07/22/22 (!) 162/92   01/18/22 120/80          No results found for this visit on 08/05/22. Assessment:       Diagnosis Orders   1. Essential hypertension  spironolactone (ALDACTONE) 100 MG tablet      2.  History of colon polyps  Amb External Referral To Gastroenterology              Plan:      Blood pressure better with spironolactone increase. Continue 100 mg and recheck in 5-1/2 months. Return in about 5 months (around 1/19/2023).

## 2022-08-10 ENCOUNTER — TELEPHONE (OUTPATIENT)
Dept: FAMILY MEDICINE CLINIC | Age: 67
End: 2022-08-10

## 2022-09-07 ENCOUNTER — TELEMEDICINE (OUTPATIENT)
Dept: FAMILY MEDICINE CLINIC | Age: 67
End: 2022-09-07
Payer: MEDICARE

## 2022-09-07 DIAGNOSIS — Z00.00 MEDICARE ANNUAL WELLNESS VISIT, SUBSEQUENT: Primary | ICD-10-CM

## 2022-09-07 PROCEDURE — 3017F COLORECTAL CA SCREEN DOC REV: CPT | Performed by: FAMILY MEDICINE

## 2022-09-07 PROCEDURE — G0439 PPPS, SUBSEQ VISIT: HCPCS | Performed by: FAMILY MEDICINE

## 2022-09-07 PROCEDURE — 1123F ACP DISCUSS/DSCN MKR DOCD: CPT | Performed by: FAMILY MEDICINE

## 2022-09-07 SDOH — ECONOMIC STABILITY: FOOD INSECURITY: WITHIN THE PAST 12 MONTHS, THE FOOD YOU BOUGHT JUST DIDN'T LAST AND YOU DIDN'T HAVE MONEY TO GET MORE.: NEVER TRUE

## 2022-09-07 SDOH — ECONOMIC STABILITY: FOOD INSECURITY: WITHIN THE PAST 12 MONTHS, YOU WORRIED THAT YOUR FOOD WOULD RUN OUT BEFORE YOU GOT MONEY TO BUY MORE.: NEVER TRUE

## 2022-09-07 ASSESSMENT — PATIENT HEALTH QUESTIONNAIRE - PHQ9
SUM OF ALL RESPONSES TO PHQ QUESTIONS 1-9: 0
SUM OF ALL RESPONSES TO PHQ9 QUESTIONS 1 & 2: 0
2. FEELING DOWN, DEPRESSED OR HOPELESS: 0
1. LITTLE INTEREST OR PLEASURE IN DOING THINGS: 0

## 2022-09-07 ASSESSMENT — LIFESTYLE VARIABLES
HOW MANY STANDARD DRINKS CONTAINING ALCOHOL DO YOU HAVE ON A TYPICAL DAY: 1 OR 2
HOW OFTEN DO YOU HAVE A DRINK CONTAINING ALCOHOL: MONTHLY OR LESS

## 2022-09-07 ASSESSMENT — SOCIAL DETERMINANTS OF HEALTH (SDOH): HOW HARD IS IT FOR YOU TO PAY FOR THE VERY BASICS LIKE FOOD, HOUSING, MEDICAL CARE, AND HEATING?: NOT HARD AT ALL

## 2022-09-07 NOTE — PATIENT INSTRUCTIONS
Personalized Preventive Plan for Ayse Links - 9/7/2022  Medicare offers a range of preventive health benefits. Some of the tests and screenings are paid in full while other may be subject to a deductible, co-insurance, and/or copay. Some of these benefits include a comprehensive review of your medical history including lifestyle, illnesses that may run in your family, and various assessments and screenings as appropriate. After reviewing your medical record and screening and assessments performed today your provider may have ordered immunizations, labs, imaging, and/or referrals for you. A list of these orders (if applicable) as well as your Preventive Care list are included within your After Visit Summary for your review. Other Preventive Recommendations:    A preventive eye exam performed by an eye specialist is recommended every 1-2 years to screen for glaucoma; cataracts, macular degeneration, and other eye disorders. A preventive dental visit is recommended every 6 months. Try to get at least 150 minutes of exercise per week or 10,000 steps per day on a pedometer . Order or download the FREE \"Exercise & Physical Activity: Your Everyday Guide\" from [employer]. Call 7-233.140.6919 or search [employer] online. You need 6184-8318 mg of calcium and 0693-6451 IU of vitamin D per day. It is possible to meet your calcium requirement with diet alone, but a vitamin D supplement is usually necessary to meet this goal.  When exposed to the sun, use a sunscreen that protects against both UVA and UVB radiation with an SPF of 30 or greater. Reapply every 2 to 3 hours or after sweating, drying off with a towel, or swimming. Always wear a seat belt when traveling in a car. Always wear a helmet when riding a bicycle or motorcycle.

## 2022-09-07 NOTE — PROGRESS NOTES
Medicare Annual Wellness Visit    Alicia Wilson is here for Medicare AWV    Assessment & Plan   Medicare annual wellness visit, subsequent    Recommendations for Preventive Services Due: see orders and patient instructions/AVS.  Recommended screening schedule for the next 5-10 years is provided to the patient in written form: see Patient Instructions/AVS.     No follow-ups on file. Subjective       Patient's complete Health Risk Assessment and screening values have been reviewed and are found in Flowsheets. The following problems were reviewed today and where indicated follow up appointments were made and/or referrals ordered. Positive Risk Factor Screenings with Interventions:       Tobacco Use:  Tobacco Use: High Risk    Smoking Tobacco Use: Every Day    Smokeless Tobacco Use: Never     E-cigarette/Vaping       Questions Responses    E-cigarette/Vaping Use     Start Date     Passive Exposure     Quit Date     Counseling Given     Comments           Substance Use - Tobacco Interventions:  patient is not ready to work toward tobacco cessation at this time, states she smokes sporadically and not everyday     Drug Use Screening: DAST-10 Score: 0  DAST-10 Score Interpretation:  1-2: Low level - Monitor, re-assess at a later date; 3-5: Moderate level - Further Investigation; 6-8: Substantial level - Intensive Assessment; 9-10: Severe level - Intensive Assessment  Substance Use - Drug Use Interventions:  patient is not ready to change his/her recreational drug use behavior at this time, patient states she will smoke marijuana about 2 times per week, states it helps her sleep.        General Health and ACP:  General  In general, how would you say your health is?: Very Good  In the past 7 days, have you experienced any of the following: New or Increased Pain, New or Increased Fatigue, Loneliness, Social Isolation, Stress or Anger?: (!) Yes  Select all that apply: (!) Stress (stress due to being told there were no hearing aids that can help her hear better)  Do you get the social and emotional support that you need?: Yes  Do you have a Living Will?: (!) No    Advance Directives       Power of [address] Will ACP-Advance Directive ACP-Power of     Not on File Not on File Not on File Not on File          General Health Risk Interventions:  Stress: patient's comments regarding reasons for stress and/or anger: patient states stress due to recently being told there were no hearing aids that would help her hear better  No Living Will: Advance Care Planning addressed with patient today    Health Habits/Nutrition:  Physical Activity: Inactive    Days of Exercise per Week: 0 days    Minutes of Exercise per Session: 0 min     Have you lost any weight without trying in the past 3 months?: No     Have you seen the dentist within the past year?: N/A - wear dentures  Health Habits/Nutrition Interventions:  Inadequate physical activity:  patient is not ready to increase his/her physical activity level at this time    Hearing/Vision:  Do you or your family notice any trouble with your hearing that hasn't been managed with hearing aids?: (!) Yes (has hearing loss and has seen specialist, hearing aids will not help her to hear better)  Do you have difficulty driving, watching TV, or doing any of your daily activities because of your eyesight?: (!) Yes (has cataracts, has seen specialist)  Have you had an eye exam within the past year?: Yes  No results found.   Hearing/Vision Interventions:  Hearing concerns:  patient declines any further evaluation/treatment for hearing issues  Vision concerns:  patient declines any further evaluation/treatment for this issue     ADLs:  In the past 7 days, did you need help from others to perform any of the following everyday activities: Eating, dressing, grooming, bathing, toileting, or walking/balance?: No  In the past 7 days, did you need help from others to take care of any of the following: Laundry, housekeeping, banking/finances, shopping, telephone use, food preparation, transportation, or taking medications?: (!) Yes  Select all that apply: (!) Transportation (fiance)  ADL Interventions:  Patient declines any further evaluation/treatment for this issue  Patient states her fiance drives her since she has diminished hearing          Objective      Patient-Reported Vitals  No data recorded        Unable to obtain 3 vital signs due to patient not having equipment to take blood pressure/temperature. Allergies   Allergen Reactions    Ace Inhibitors Other (See Comments)     cough     Prior to Visit Medications    Medication Sig Taking? Authorizing Provider   Acetaminophen (TYLENOL ARTHRITIS PAIN PO) Take by mouth as needed Yes Historical Provider, MD   spironolactone (ALDACTONE) 100 MG tablet Take 1 tablet by mouth in the morning. Yes Galen Ayala MD   polyethyl glycol-propyl glycol 0.4-0.3 % (SYSTANE) 0.4-0.3 % ophthalmic solution Place 1 drop into both eyes in the morning, at noon, and at bedtime Yes Historical Provider, MD   atenolol (TENORMIN) 100 MG tablet Take 1 tablet by mouth in the morning. Yes Galen Ayala MD   losartan (COZAAR) 100 MG tablet Take 1 tablet by mouth in the morning. Yes Galen Ayala MD   solifenacin (VESICARE) 5 MG tablet Take 1 tablet by mouth in the morning. Yes Galen Ayala MD   alendronate (FOSAMAX) 70 MG tablet Take 1 tablet by mouth every 7 days Yes Galen Ayala MD   acetaminophen (TYLENOL) 500 MG tablet Take 500 mg by mouth every 6 hours as needed for Pain  Patient not taking: No sig reported  Historical Provider, MD   solifenacin (VESICARE) 5 MG tablet Take 1 tablet by mouth daily.   MD Shahzad Lees (Including outside providers/suppliers regularly involved in providing care):   Patient Care Team:  Galen Ayala MD as PCP - General (Family Medicine)  Galen Ayala MD as PCP - [facility] Empaneled Provider  Cipriano Garland DO as Consulting Physician (Obstetrics & Gynecology)  Kaushik Kirkpatrick MD as Consulting Physician (Gastroenterology)  Irene West MD as Surgeon (Urology)  Hiren Holm MD as Consulting Physician (Otolaryngology)  Yi Valle MD as Surgeon (Ophthalmology)     Reviewed and updated this visit:  Allergies  Meds             IYg LPN, [birth year], performed the documented evaluation under the direct supervision of the attending physician. Ana Ventura, was evaluated through a synchronous (real-time) audio encounter. The patient (or guardian if applicable) is aware that this is a billable service, which includes applicable co-pays. This Virtual Visit was conducted with patient's (and/or legal guardian's) consent. The visit was conducted pursuant to the emergency declaration under the [address] [place] [address], [address] [place] [address] authority and the [employer] and [employer] General Act. Patient identification was verified, and a caregiver was present when appropriate. The patient was located at Home: [facility] [address] [address]. Provider was located at [facility] (Appt Dept): [facility]  [address] [facility]. Total time spent for this encounter: Not billed by time    --Yg Sharp LPN on 2/0/7865 at 21:52 AM    An electronic signature was used to authenticate this note.

## 2023-01-09 DIAGNOSIS — I10 ESSENTIAL HYPERTENSION: ICD-10-CM

## 2023-01-09 RX ORDER — ATENOLOL 100 MG/1
TABLET ORAL
Qty: 90 TABLET | Refills: 3 | OUTPATIENT
Start: 2023-01-09

## 2023-01-13 RX ORDER — LOSARTAN POTASSIUM 100 MG/1
TABLET ORAL
Qty: 90 TABLET | Refills: 3 | OUTPATIENT
Start: 2023-01-13

## 2023-01-19 ENCOUNTER — OFFICE VISIT (OUTPATIENT)
Dept: FAMILY MEDICINE CLINIC | Age: 68
End: 2023-01-19
Payer: MEDICARE

## 2023-01-19 VITALS
DIASTOLIC BLOOD PRESSURE: 64 MMHG | BODY MASS INDEX: 22.82 KG/M2 | SYSTOLIC BLOOD PRESSURE: 110 MMHG | HEIGHT: 66 IN | WEIGHT: 142 LBS | HEART RATE: 69 BPM

## 2023-01-19 DIAGNOSIS — I10 ESSENTIAL HYPERTENSION: Primary | ICD-10-CM

## 2023-01-19 DIAGNOSIS — Z23 NEEDS FLU SHOT: ICD-10-CM

## 2023-01-19 DIAGNOSIS — Z86.010 HISTORY OF COLON POLYPS: ICD-10-CM

## 2023-01-19 DIAGNOSIS — Z12.31 SCREENING MAMMOGRAM FOR BREAST CANCER: ICD-10-CM

## 2023-01-19 PROCEDURE — G8427 DOCREV CUR MEDS BY ELIG CLIN: HCPCS | Performed by: FAMILY MEDICINE

## 2023-01-19 PROCEDURE — 1090F PRES/ABSN URINE INCON ASSESS: CPT | Performed by: FAMILY MEDICINE

## 2023-01-19 PROCEDURE — 3017F COLORECTAL CA SCREEN DOC REV: CPT | Performed by: FAMILY MEDICINE

## 2023-01-19 PROCEDURE — G8420 CALC BMI NORM PARAMETERS: HCPCS | Performed by: FAMILY MEDICINE

## 2023-01-19 PROCEDURE — 99213 OFFICE O/P EST LOW 20 MIN: CPT | Performed by: FAMILY MEDICINE

## 2023-01-19 PROCEDURE — G8484 FLU IMMUNIZE NO ADMIN: HCPCS | Performed by: FAMILY MEDICINE

## 2023-01-19 PROCEDURE — 3078F DIAST BP <80 MM HG: CPT | Performed by: FAMILY MEDICINE

## 2023-01-19 PROCEDURE — G0008 ADMIN INFLUENZA VIRUS VAC: HCPCS | Performed by: FAMILY MEDICINE

## 2023-01-19 PROCEDURE — 3074F SYST BP LT 130 MM HG: CPT | Performed by: FAMILY MEDICINE

## 2023-01-19 PROCEDURE — 1123F ACP DISCUSS/DSCN MKR DOCD: CPT | Performed by: FAMILY MEDICINE

## 2023-01-19 PROCEDURE — 4004F PT TOBACCO SCREEN RCVD TLK: CPT | Performed by: FAMILY MEDICINE

## 2023-01-19 PROCEDURE — G8399 PT W/DXA RESULTS DOCUMENT: HCPCS | Performed by: FAMILY MEDICINE

## 2023-01-19 PROCEDURE — 90694 VACC AIIV4 NO PRSRV 0.5ML IM: CPT | Performed by: FAMILY MEDICINE

## 2023-01-19 RX ORDER — LOSARTAN POTASSIUM 100 MG/1
100 TABLET ORAL DAILY
Qty: 90 TABLET | Refills: 1 | Status: SHIPPED | OUTPATIENT
Start: 2023-01-19

## 2023-01-19 RX ORDER — ATENOLOL 100 MG/1
100 TABLET ORAL DAILY
Qty: 90 TABLET | Refills: 1 | Status: SHIPPED | OUTPATIENT
Start: 2023-01-19

## 2023-01-19 RX ORDER — SPIRONOLACTONE 100 MG/1
100 TABLET, FILM COATED ORAL DAILY
Qty: 90 TABLET | Refills: 1 | Status: SHIPPED | OUTPATIENT
Start: 2023-01-19

## 2023-01-19 ASSESSMENT — ENCOUNTER SYMPTOMS: SHORTNESS OF BREATH: 0

## 2023-01-19 ASSESSMENT — PATIENT HEALTH QUESTIONNAIRE - PHQ9
SUM OF ALL RESPONSES TO PHQ QUESTIONS 1-9: 1
1. LITTLE INTEREST OR PLEASURE IN DOING THINGS: 0
SUM OF ALL RESPONSES TO PHQ QUESTIONS 1-9: 1
2. FEELING DOWN, DEPRESSED OR HOPELESS: 1
SUM OF ALL RESPONSES TO PHQ QUESTIONS 1-9: 1
SUM OF ALL RESPONSES TO PHQ QUESTIONS 1-9: 1
SUM OF ALL RESPONSES TO PHQ9 QUESTIONS 1 & 2: 1

## 2023-01-19 NOTE — PROGRESS NOTES
Patient ID: Tarun Cheung 1955    . Chief Complaint   Patient presents with    Hypertension         Hypertension  This is a chronic problem. The current episode started more than 1 year ago. The problem has been rapidly improving since onset. The problem is controlled. Pertinent negatives include no palpitations or shortness of breath. Risk factors for coronary artery disease include post-menopausal state. Past treatments include angiotensin blockers, diuretics and beta blockers. There are no compliance problems. Patient Active Problem List   Diagnosis    Goiter    Hearing loss    Hyperlipidemia    GERD (gastroesophageal reflux disease)    Essential hypertension    High serum renin    Colon polyp    Acute superficial gastritis without hemorrhage    Age-related osteoporosis without current pathological fracture    Hiatal hernia    Tobacco abuse    History of colon polyps       Past Surgical History:   Procedure Laterality Date    BREAST BIOPSY      COLONOSCOPY  2012    polyp ([address])    COLONOSCOPY  09/05/2017    repeat 5 years    HYSTERECTOMY (CERVIX STATUS UNKNOWN)  1990's    complete (fibroids)    OVARY REMOVAL      THYROID SURGERY      nodule    UPPER GASTROINTESTINAL ENDOSCOPY  08/29/2017    ulcer, non bleeding    UPPER GASTROINTESTINAL ENDOSCOPY  12/06/2017       Family History   Problem Relation Age of Onset    Kidney Disease Mother     No Known Problems Father     Diabetes Sister         type 2    Breast Cancer Sister     Heart Disease Sister     No Known Problems Sister     No Known Problems Sister     High Blood Pressure Sister     No Known Problems Sister     Lung Cancer Brother         Laryngeal       Current Outpatient Medications on File Prior to Visit   Medication Sig Dispense Refill    Lifitegrast 5 % SOLN Apply to eye      Acetaminophen (TYLENOL ARTHRITIS PAIN PO) Take by mouth as needed      solifenacin (VESICARE) 5 MG tablet Take 1 tablet by mouth in the morning.  90 tablet 3 alendronate (FOSAMAX) 70 MG tablet Take 1 tablet by mouth every 7 days 13 tablet 3    [DISCONTINUED] solifenacin (VESICARE) 5 MG tablet Take 1 tablet by mouth daily. 90 tablet 1     No current facility-administered medications on file prior to visit. Objective:         Physical Exam  Vitals and nursing note reviewed. Constitutional:       Appearance: She is well-developed. HENT:      Head: Normocephalic and atraumatic. Cardiovascular:      Rate and Rhythm: Normal rate and regular rhythm. Heart sounds: Normal heart sounds, S1 normal and S2 normal.   Pulmonary:      Effort: Pulmonary effort is normal. No respiratory distress. Breath sounds: Normal breath sounds. No wheezing. Musculoskeletal:      Cervical back: Neck supple. Skin:     General: Skin is warm and dry. Neurological:      Mental Status: She is alert. Vitals:    01/19/23 1034   BP: 110/64   Site: Left Upper Arm   Position: Sitting   Cuff Size: Medium Adult   Pulse: 69   Weight: 142 lb (64.4 kg)   Height: 5' 6\" (1.676 m)     Body mass index is 22.92 kg/m². Wt Readings from Last 3 Encounters:   01/19/23 142 lb (64.4 kg)   08/05/22 148 lb (67.1 kg)   07/22/22 150 lb (68 kg)     BP Readings from Last 3 Encounters:   01/19/23 110/64   08/05/22 138/88   07/22/22 (!) 162/92          No results found for this visit on 01/19/23. The 10-year ASCVD risk score (Kwasi OROZCO, et al., 2019) is: 19%    Values used to calculate the score:      Age: 69 years      Sex: Female      Is Non- : Yes      Diabetic: No      Tobacco smoker: Yes      Systolic Blood Pressure: 788 mmHg      Is BP treated: Yes      HDL Cholesterol: 43 mg/dL      Total Cholesterol: 244 mg/dL  Lab Review   No visits with results within 2 Month(s) from this visit.    Latest known visit with results is:   Office Visit on 07/22/2022   Component Date Value    Sodium 07/22/2022 140     Potassium 07/22/2022 3.9     Chloride 07/22/2022 103 CO2 07/22/2022 28     Anion Gap 07/22/2022 9     Glucose 07/22/2022 92     BUN 07/22/2022 9     Creatinine 07/22/2022 0.8     GFR Non- 07/22/2022 >60     GFR  07/22/2022 >60     Calcium 07/22/2022 9.2     Total Protein 07/22/2022 6.8     Albumin 07/22/2022 4.2     Albumin/Globulin Ratio 07/22/2022 1.6     Total Bilirubin 07/22/2022 0.4     Alkaline Phosphatase 07/22/2022 46     ALT 07/22/2022 7 (A)     AST 07/22/2022 14 (A)     Microalbumin, Random Uri* 07/22/2022 <1.20     Creatinine, Ur 07/22/2022 93.1     Microalbumin Creatinine * 07/22/2022 see below     Cholesterol, Total 07/22/2022 244 (A)     Triglycerides 07/22/2022 159 (A)     HDL 07/22/2022 43     LDL Calculated 07/22/2022 169 (A)     VLDL Cholesterol Calcula* 07/22/2022 32            Assessment:       Diagnosis Orders   1. Essential hypertension  atenolol (TENORMIN) 100 MG tablet    spironolactone (ALDACTONE) 100 MG tablet      2. Needs flu shot  Influenza, FLUAD, (age 72 y+), IM, PF, 0.5 mL      3. Screening mammogram for breast cancer  YAYO DIGITAL SCREEN W CAD BILATERAL PER PROTOCOL      4. History of colon polyps                Plan:      Hypertension stable. Continue Tenormin and spironolactone    Reminded to get mammogram and colonoscopy. Recheck in 6 months. Return in about 25 weeks (around 7/13/2023) for HTN.

## 2023-02-08 ENCOUNTER — HOSPITAL ENCOUNTER (OUTPATIENT)
Dept: WOMENS IMAGING | Age: 68
Discharge: HOME | End: 2023-02-08
Payer: MEDICARE

## 2023-02-08 DIAGNOSIS — Z12.31 SCREENING MAMMOGRAM FOR BREAST CANCER: ICD-10-CM

## 2023-02-08 PROCEDURE — 77063 BREAST TOMOSYNTHESIS BI: CPT

## 2023-06-05 DIAGNOSIS — M81.0 AGE-RELATED OSTEOPOROSIS WITHOUT CURRENT PATHOLOGICAL FRACTURE: ICD-10-CM

## 2023-06-05 RX ORDER — ALENDRONATE SODIUM 70 MG/1
TABLET ORAL
Qty: 12 TABLET | Refills: 3 | OUTPATIENT
Start: 2023-06-05

## 2023-07-10 DIAGNOSIS — I10 ESSENTIAL HYPERTENSION: ICD-10-CM

## 2023-07-10 RX ORDER — ATENOLOL 100 MG/1
TABLET ORAL
Qty: 90 TABLET | Refills: 3 | OUTPATIENT
Start: 2023-07-10

## 2023-07-12 RX ORDER — LOSARTAN POTASSIUM 100 MG/1
TABLET ORAL
Qty: 90 TABLET | Refills: 3 | OUTPATIENT
Start: 2023-07-12

## 2023-07-13 ENCOUNTER — OFFICE VISIT (OUTPATIENT)
Dept: FAMILY MEDICINE CLINIC | Age: 68
End: 2023-07-13
Payer: MEDICARE

## 2023-07-13 VITALS
DIASTOLIC BLOOD PRESSURE: 83 MMHG | HEART RATE: 77 BPM | WEIGHT: 137 LBS | OXYGEN SATURATION: 98 % | SYSTOLIC BLOOD PRESSURE: 119 MMHG | BODY MASS INDEX: 22.11 KG/M2

## 2023-07-13 DIAGNOSIS — R32 URINARY INCONTINENCE, UNSPECIFIED TYPE: ICD-10-CM

## 2023-07-13 DIAGNOSIS — M70.71 BURSITIS OF OTHER BURSA OF RIGHT HIP: ICD-10-CM

## 2023-07-13 DIAGNOSIS — R73.02 IMPAIRED GLUCOSE TOLERANCE: ICD-10-CM

## 2023-07-13 DIAGNOSIS — M81.0 AGE-RELATED OSTEOPOROSIS WITHOUT CURRENT PATHOLOGICAL FRACTURE: ICD-10-CM

## 2023-07-13 DIAGNOSIS — I10 ESSENTIAL HYPERTENSION: Primary | ICD-10-CM

## 2023-07-13 LAB
ALBUMIN SERPL-MCNC: 4.4 G/DL (ref 3.4–5)
ALBUMIN/GLOB SERPL: 1.6 {RATIO} (ref 1.1–2.2)
ALP SERPL-CCNC: 41 U/L (ref 40–129)
ALT SERPL-CCNC: 6 U/L (ref 10–40)
ANION GAP SERPL CALCULATED.3IONS-SCNC: 9 MMOL/L (ref 3–16)
AST SERPL-CCNC: 12 U/L (ref 15–37)
BILIRUB SERPL-MCNC: 0.5 MG/DL (ref 0–1)
BUN SERPL-MCNC: 9 MG/DL (ref 7–20)
CALCIUM SERPL-MCNC: 10.2 MG/DL (ref 8.3–10.6)
CHLORIDE SERPL-SCNC: 104 MMOL/L (ref 99–110)
CHOLEST SERPL-MCNC: 234 MG/DL (ref 0–199)
CO2 SERPL-SCNC: 25 MMOL/L (ref 21–32)
CREAT SERPL-MCNC: 1.1 MG/DL (ref 0.6–1.2)
CREAT UR-MCNC: 116.8 MG/DL (ref 28–259)
GFR SERPLBLD CREATININE-BSD FMLA CKD-EPI: 54 ML/MIN/{1.73_M2}
GLUCOSE SERPL-MCNC: 100 MG/DL (ref 70–99)
HDLC SERPL-MCNC: 39 MG/DL (ref 40–60)
LDLC SERPL CALC-MCNC: 168 MG/DL
MICROALBUMIN UR DL<=1MG/L-MCNC: <1.2 MG/DL
MICROALBUMIN/CREAT UR: NORMAL MG/G (ref 0–30)
POTASSIUM SERPL-SCNC: 4.6 MMOL/L (ref 3.5–5.1)
PROT SERPL-MCNC: 7.1 G/DL (ref 6.4–8.2)
SODIUM SERPL-SCNC: 138 MMOL/L (ref 136–145)
TRIGL SERPL-MCNC: 134 MG/DL (ref 0–150)
TSH SERPL DL<=0.005 MIU/L-ACNC: 1.66 UIU/ML (ref 0.27–4.2)
VLDLC SERPL CALC-MCNC: 27 MG/DL

## 2023-07-13 PROCEDURE — 3079F DIAST BP 80-89 MM HG: CPT | Performed by: FAMILY MEDICINE

## 2023-07-13 PROCEDURE — 99214 OFFICE O/P EST MOD 30 MIN: CPT | Performed by: FAMILY MEDICINE

## 2023-07-13 PROCEDURE — 1090F PRES/ABSN URINE INCON ASSESS: CPT | Performed by: FAMILY MEDICINE

## 2023-07-13 PROCEDURE — 4004F PT TOBACCO SCREEN RCVD TLK: CPT | Performed by: FAMILY MEDICINE

## 2023-07-13 PROCEDURE — 1123F ACP DISCUSS/DSCN MKR DOCD: CPT | Performed by: FAMILY MEDICINE

## 2023-07-13 PROCEDURE — G8420 CALC BMI NORM PARAMETERS: HCPCS | Performed by: FAMILY MEDICINE

## 2023-07-13 PROCEDURE — 3074F SYST BP LT 130 MM HG: CPT | Performed by: FAMILY MEDICINE

## 2023-07-13 PROCEDURE — 3017F COLORECTAL CA SCREEN DOC REV: CPT | Performed by: FAMILY MEDICINE

## 2023-07-13 PROCEDURE — 36415 COLL VENOUS BLD VENIPUNCTURE: CPT | Performed by: FAMILY MEDICINE

## 2023-07-13 PROCEDURE — G8399 PT W/DXA RESULTS DOCUMENT: HCPCS | Performed by: FAMILY MEDICINE

## 2023-07-13 PROCEDURE — 0509F URINE INCON PLAN DOCD: CPT | Performed by: FAMILY MEDICINE

## 2023-07-13 PROCEDURE — G8427 DOCREV CUR MEDS BY ELIG CLIN: HCPCS | Performed by: FAMILY MEDICINE

## 2023-07-13 RX ORDER — SPIRONOLACTONE 100 MG/1
100 TABLET, FILM COATED ORAL DAILY
Qty: 90 TABLET | Refills: 1 | Status: SHIPPED | OUTPATIENT
Start: 2023-07-13

## 2023-07-13 RX ORDER — ALENDRONATE SODIUM 70 MG/1
70 TABLET ORAL
Qty: 13 TABLET | Refills: 3 | Status: SHIPPED | OUTPATIENT
Start: 2023-07-13

## 2023-07-13 RX ORDER — ATENOLOL 100 MG/1
100 TABLET ORAL DAILY
Qty: 90 TABLET | Refills: 1 | Status: SHIPPED | OUTPATIENT
Start: 2023-07-13

## 2023-07-13 RX ORDER — LOSARTAN POTASSIUM 100 MG/1
100 TABLET ORAL DAILY
Qty: 90 TABLET | Refills: 1 | Status: SHIPPED | OUTPATIENT
Start: 2023-07-13

## 2023-07-13 RX ORDER — SOLIFENACIN SUCCINATE 5 MG/1
5 TABLET, FILM COATED ORAL DAILY
Qty: 90 TABLET | Refills: 1 | Status: SHIPPED | OUTPATIENT
Start: 2023-07-13

## 2023-07-13 ASSESSMENT — ENCOUNTER SYMPTOMS: SHORTNESS OF BREATH: 0

## 2023-07-13 NOTE — PROGRESS NOTES
Patient ID: Briana Meehan 1955    . Chief Complaint   Patient presents with    Hypertension    Other     Worried about her surgery that she is having next Thursday, having eye surgery, worried about her eating habits as well      Hip Pain     Think she might have spelt wrong woke up to  pain in right hip yesterday, feels tender and sore          Hypertension  This is a chronic problem. The current episode started more than 1 year ago. The problem has been rapidly improving since onset. The problem is controlled. Pertinent negatives include no palpitations or shortness of breath. Risk factors for coronary artery disease include post-menopausal state. Past treatments include angiotensin blockers, diuretics and beta blockers. There are no compliance problems. Hip pain: better with tylenol. Worse with sleeping on it. No injury. Similar pain years ago after sitting for a while to get her nails done.   Right hip      Patient Active Problem List   Diagnosis    Goiter    Hearing loss    Hyperlipidemia    GERD (gastroesophageal reflux disease)    Essential hypertension    High serum renin    Colon polyp    Acute superficial gastritis without hemorrhage    Age-related osteoporosis without current pathological fracture    Hiatal hernia    Tobacco abuse    History of colon polyps       Past Surgical History:   Procedure Laterality Date    BREAST BIOPSY      COLONOSCOPY  2012    polyp ([facility])    COLONOSCOPY  09/05/2017    repeat 5 years    HYSTERECTOMY (CERVIX STATUS UNKNOWN)  1990's    complete (fibroids)    OVARY REMOVAL      THYROID SURGERY      nodule    UPPER GASTROINTESTINAL ENDOSCOPY  08/29/2017    ulcer, non bleeding    UPPER GASTROINTESTINAL ENDOSCOPY  12/06/2017       Family History   Problem Relation Age of Onset    Kidney Disease Mother     No Known Problems Father     Diabetes Sister         type 2    Breast Cancer Sister     Heart Disease Sister     No Known Problems Sister     No Known Problems

## 2023-07-18 DIAGNOSIS — E78.5 HYPERLIPIDEMIA, UNSPECIFIED HYPERLIPIDEMIA TYPE: Primary | ICD-10-CM

## 2023-07-18 RX ORDER — ATORVASTATIN CALCIUM 40 MG/1
40 TABLET, FILM COATED ORAL DAILY
Qty: 90 TABLET | Refills: 1 | Status: SHIPPED | OUTPATIENT
Start: 2023-07-18

## 2023-09-13 ENCOUNTER — TELEMEDICINE (OUTPATIENT)
Dept: FAMILY MEDICINE CLINIC | Age: 68
End: 2023-09-13
Payer: MEDICARE

## 2023-09-13 DIAGNOSIS — Z00.00 MEDICARE ANNUAL WELLNESS VISIT, SUBSEQUENT: Primary | ICD-10-CM

## 2023-09-13 PROCEDURE — 3017F COLORECTAL CA SCREEN DOC REV: CPT | Performed by: FAMILY MEDICINE

## 2023-09-13 PROCEDURE — 1123F ACP DISCUSS/DSCN MKR DOCD: CPT | Performed by: FAMILY MEDICINE

## 2023-09-13 PROCEDURE — G0439 PPPS, SUBSEQ VISIT: HCPCS | Performed by: FAMILY MEDICINE

## 2023-09-13 SDOH — ECONOMIC STABILITY: HOUSING INSECURITY
IN THE LAST 12 MONTHS, WAS THERE A TIME WHEN YOU DID NOT HAVE A STEADY PLACE TO SLEEP OR SLEPT IN A SHELTER (INCLUDING NOW)?: NO

## 2023-09-13 SDOH — ECONOMIC STABILITY: INCOME INSECURITY: HOW HARD IS IT FOR YOU TO PAY FOR THE VERY BASICS LIKE FOOD, HOUSING, MEDICAL CARE, AND HEATING?: NOT HARD AT ALL

## 2023-09-13 SDOH — ECONOMIC STABILITY: FOOD INSECURITY: WITHIN THE PAST 12 MONTHS, YOU WORRIED THAT YOUR FOOD WOULD RUN OUT BEFORE YOU GOT MONEY TO BUY MORE.: NEVER TRUE

## 2023-09-13 SDOH — ECONOMIC STABILITY: FOOD INSECURITY: WITHIN THE PAST 12 MONTHS, THE FOOD YOU BOUGHT JUST DIDN'T LAST AND YOU DIDN'T HAVE MONEY TO GET MORE.: NEVER TRUE

## 2023-09-13 ASSESSMENT — LIFESTYLE VARIABLES
HOW OFTEN DO YOU HAVE A DRINK CONTAINING ALCOHOL: MONTHLY OR LESS
HOW MANY STANDARD DRINKS CONTAINING ALCOHOL DO YOU HAVE ON A TYPICAL DAY: 1 OR 2

## 2023-09-13 ASSESSMENT — PATIENT HEALTH QUESTIONNAIRE - PHQ9
2. FEELING DOWN, DEPRESSED OR HOPELESS: 1
SUM OF ALL RESPONSES TO PHQ QUESTIONS 1-9: 1
1. LITTLE INTEREST OR PLEASURE IN DOING THINGS: 0
SUM OF ALL RESPONSES TO PHQ QUESTIONS 1-9: 1
SUM OF ALL RESPONSES TO PHQ9 QUESTIONS 1 & 2: 1

## 2023-09-13 NOTE — PATIENT INSTRUCTIONS
Personalized Preventive Plan for Zuhair Pandey - 9/13/2023  Medicare offers a range of preventive health benefits. Some of the tests and screenings are paid in full while other may be subject to a deductible, co-insurance, and/or copay. Some of these benefits include a comprehensive review of your medical history including lifestyle, illnesses that may run in your family, and various assessments and screenings as appropriate. After reviewing your medical record and screening and assessments performed today your provider may have ordered immunizations, labs, imaging, and/or referrals for you. A list of these orders (if applicable) as well as your Preventive Care list are included within your After Visit Summary for your review. Other Preventive Recommendations:    A preventive eye exam performed by an eye specialist is recommended every 1-2 years to screen for glaucoma; cataracts, macular degeneration, and other eye disorders. A preventive dental visit is recommended every 6 months. Try to get at least 150 minutes of exercise per week or 10,000 steps per day on a pedometer . Order or download the FREE \"Exercise & Physical Activity: Your Everyday Guide\" from [employer]. Call 6-983.400.8106 or search [employer] online. You need 6009-3368 mg of calcium and 1612-2327 IU of vitamin D per day. It is possible to meet your calcium requirement with diet alone, but a vitamin D supplement is usually necessary to meet this goal.  When exposed to the sun, use a sunscreen that protects against both UVA and UVB radiation with an SPF of 30 or greater. Reapply every 2 to 3 hours or after sweating, drying off with a towel, or swimming. Always wear a seat belt when traveling in a car. Always wear a helmet when riding a bicycle or motorcycle.

## 2023-09-14 NOTE — PROGRESS NOTES
This encounter was performed under my, Lincoln Spicer, direct supervision, 9/13/2023.
calculate BMI. Inactivity Interventions:  Patient declined any further interventions or treatment       Hearing Screen:  Do you or your family notice any trouble with your hearing that hasn't been managed with hearing aids?: (!) Yes (problems w/ word clarification-has hearing aids, but they don't work well for her-has seen Audiologist)    Interventions:  Patient comments: states she has seen an Audiologist. She says she has problems with word clarification and does have hearing aids, but they don't work very well for her. Patient declines any further evaluation or treatment      ADL's:   Patient reports needing help with:  Select all that apply: (!) Transportation (fiance)  Interventions:  Patient comments: states her fiance drives her. Patient declined any further interventions or treatment    Advanced Directives:  Do you have a Living Will?: (!) No    Intervention:  has NO advanced directive - not interested in additional information at this time. Tobacco Use:  Tobacco Use: High Risk (9/13/2023)    Patient History     Smoking Tobacco Use: Every Day     Smokeless Tobacco Use: Never     Passive Exposure: Not on file     E-cigarette/Vaping       Questions Responses    E-cigarette/Vaping Use     Start Date     Passive Exposure     Quit Date     Counseling Given     Comments           Interventions:  Patient comments: states she is not interested in tobacco cessation at this time. States her fiance \"smokes like a haystack\", and it would be difficult for her to quit. Patient declined any further intervention or treatment                        Objective      Patient-Reported Vitals  No data recorded     Unable to obtain 3 vital signs due to patient not having equipment to take blood pressure/temperature. Allergies   Allergen Reactions    Ace Inhibitors Other (See Comments)     cough     Prior to Visit Medications    Medication Sig Taking?  Authorizing Provider   Polyethyl Glycol-Propyl Glycol

## 2023-12-22 DIAGNOSIS — E78.5 HYPERLIPIDEMIA, UNSPECIFIED HYPERLIPIDEMIA TYPE: ICD-10-CM

## 2023-12-22 RX ORDER — ATORVASTATIN CALCIUM 40 MG/1
40 TABLET, FILM COATED ORAL DAILY
Qty: 90 TABLET | Refills: 3 | OUTPATIENT
Start: 2023-12-22

## 2024-01-02 ENCOUNTER — HOSPITAL ENCOUNTER (OUTPATIENT)
Age: 69
Discharge: HOME | End: 2024-01-02
Payer: MEDICARE

## 2024-01-02 DIAGNOSIS — E78.5 HYPERLIPIDEMIA, UNSPECIFIED HYPERLIPIDEMIA TYPE: ICD-10-CM

## 2024-01-02 LAB
ALBUMIN SERPL-MCNC: 4.8 GM/DL (ref 3.4–5)
ALP BLD-CCNC: 52 IU/L (ref 40–128)
ALT SERPL-CCNC: 23 U/L (ref 10–40)
ANION GAP SERPL CALCULATED.3IONS-SCNC: 11 MMOL/L (ref 7–16)
AST SERPL-CCNC: 19 IU/L (ref 15–37)
BILIRUB SERPL-MCNC: 0.6 MG/DL (ref 0–1)
BUN SERPL-MCNC: 17 MG/DL (ref 6–23)
CALCIUM SERPL-MCNC: 10 MG/DL (ref 8.3–10.6)
CHLORIDE BLD-SCNC: 100 MMOL/L (ref 99–110)
CHOLEST SERPL-MCNC: 140 MG/DL
CO2: 24 MMOL/L (ref 21–32)
CREAT SERPL-MCNC: 1 MG/DL (ref 0.6–1.1)
GFR SERPL CREATININE-BSD FRML MDRD: >60 ML/MIN/1.73M2
GLUCOSE SERPL-MCNC: 103 MG/DL (ref 70–99)
HDLC SERPL-MCNC: 49 MG/DL
LDLC SERPL CALC-MCNC: 71 MG/DL
POTASSIUM SERPL-SCNC: 4.8 MMOL/L (ref 3.5–5.1)
SODIUM BLD-SCNC: 135 MMOL/L (ref 135–145)
TOTAL PROTEIN: 7.3 GM/DL (ref 6.4–8.2)
TRIGL SERPL-MCNC: 98 MG/DL

## 2024-01-02 PROCEDURE — 80061 LIPID PANEL: CPT

## 2024-01-02 PROCEDURE — 36415 COLL VENOUS BLD VENIPUNCTURE: CPT

## 2024-01-02 PROCEDURE — 80053 COMPREHEN METABOLIC PANEL: CPT

## 2024-01-04 DIAGNOSIS — I10 ESSENTIAL HYPERTENSION: ICD-10-CM

## 2024-01-04 RX ORDER — ATENOLOL 100 MG/1
100 TABLET ORAL DAILY
Qty: 90 TABLET | Refills: 3 | OUTPATIENT
Start: 2024-01-04

## 2024-01-09 ENCOUNTER — OFFICE VISIT (OUTPATIENT)
Dept: FAMILY MEDICINE CLINIC | Age: 69
End: 2024-01-09
Payer: MEDICARE

## 2024-01-09 VITALS
DIASTOLIC BLOOD PRESSURE: 79 MMHG | SYSTOLIC BLOOD PRESSURE: 126 MMHG | WEIGHT: 146 LBS | OXYGEN SATURATION: 99 % | BODY MASS INDEX: 23.57 KG/M2 | HEART RATE: 84 BPM

## 2024-01-09 DIAGNOSIS — I10 ESSENTIAL HYPERTENSION: ICD-10-CM

## 2024-01-09 DIAGNOSIS — I10 ESSENTIAL HYPERTENSION: Primary | ICD-10-CM

## 2024-01-09 DIAGNOSIS — Z23 NEED FOR TETANUS BOOSTER: ICD-10-CM

## 2024-01-09 DIAGNOSIS — R32 URINARY INCONTINENCE, UNSPECIFIED TYPE: ICD-10-CM

## 2024-01-09 DIAGNOSIS — E78.5 HYPERLIPIDEMIA, UNSPECIFIED HYPERLIPIDEMIA TYPE: ICD-10-CM

## 2024-01-09 DIAGNOSIS — Z23 NEEDS FLU SHOT: ICD-10-CM

## 2024-01-09 DIAGNOSIS — Z23 NEED FOR SHINGLES VACCINE: ICD-10-CM

## 2024-01-09 DIAGNOSIS — Z12.31 ENCOUNTER FOR SCREENING MAMMOGRAM FOR MALIGNANT NEOPLASM OF BREAST: ICD-10-CM

## 2024-01-09 DIAGNOSIS — Z23 NEED FOR PNEUMOCOCCAL VACCINE: ICD-10-CM

## 2024-01-09 DIAGNOSIS — M81.0 AGE-RELATED OSTEOPOROSIS WITHOUT CURRENT PATHOLOGICAL FRACTURE: ICD-10-CM

## 2024-01-09 PROCEDURE — 1123F ACP DISCUSS/DSCN MKR DOCD: CPT | Performed by: FAMILY MEDICINE

## 2024-01-09 PROCEDURE — G8427 DOCREV CUR MEDS BY ELIG CLIN: HCPCS | Performed by: FAMILY MEDICINE

## 2024-01-09 PROCEDURE — 0509F URINE INCON PLAN DOCD: CPT | Performed by: FAMILY MEDICINE

## 2024-01-09 PROCEDURE — 3017F COLORECTAL CA SCREEN DOC REV: CPT | Performed by: FAMILY MEDICINE

## 2024-01-09 PROCEDURE — 99213 OFFICE O/P EST LOW 20 MIN: CPT | Performed by: FAMILY MEDICINE

## 2024-01-09 PROCEDURE — G8484 FLU IMMUNIZE NO ADMIN: HCPCS | Performed by: FAMILY MEDICINE

## 2024-01-09 PROCEDURE — 3074F SYST BP LT 130 MM HG: CPT | Performed by: FAMILY MEDICINE

## 2024-01-09 PROCEDURE — G8399 PT W/DXA RESULTS DOCUMENT: HCPCS | Performed by: FAMILY MEDICINE

## 2024-01-09 PROCEDURE — 4004F PT TOBACCO SCREEN RCVD TLK: CPT | Performed by: FAMILY MEDICINE

## 2024-01-09 PROCEDURE — 3078F DIAST BP <80 MM HG: CPT | Performed by: FAMILY MEDICINE

## 2024-01-09 PROCEDURE — G8420 CALC BMI NORM PARAMETERS: HCPCS | Performed by: FAMILY MEDICINE

## 2024-01-09 PROCEDURE — 1090F PRES/ABSN URINE INCON ASSESS: CPT | Performed by: FAMILY MEDICINE

## 2024-01-09 RX ORDER — LOSARTAN POTASSIUM 100 MG/1
100 TABLET ORAL DAILY
Qty: 90 TABLET | Refills: 3 | OUTPATIENT
Start: 2024-01-09

## 2024-01-09 RX ORDER — SOLIFENACIN SUCCINATE 5 MG/1
5 TABLET, FILM COATED ORAL DAILY
Qty: 90 TABLET | Refills: 1 | Status: SHIPPED | OUTPATIENT
Start: 2024-01-09

## 2024-01-09 RX ORDER — LOSARTAN POTASSIUM 100 MG/1
100 TABLET ORAL DAILY
Qty: 90 TABLET | Refills: 1 | Status: SHIPPED | OUTPATIENT
Start: 2024-01-09

## 2024-01-09 RX ORDER — SPIRONOLACTONE 100 MG/1
100 TABLET, FILM COATED ORAL DAILY
Qty: 90 TABLET | Refills: 3 | OUTPATIENT
Start: 2024-01-09

## 2024-01-09 RX ORDER — SPIRONOLACTONE 100 MG/1
100 TABLET, FILM COATED ORAL DAILY
Qty: 90 TABLET | Refills: 1 | Status: SHIPPED | OUTPATIENT
Start: 2024-01-09

## 2024-01-09 RX ORDER — SOLIFENACIN SUCCINATE 5 MG/1
5 TABLET, FILM COATED ORAL DAILY
Qty: 90 TABLET | Refills: 3 | OUTPATIENT
Start: 2024-01-09

## 2024-01-09 RX ORDER — ATENOLOL 100 MG/1
100 TABLET ORAL DAILY
Qty: 90 TABLET | Refills: 1 | Status: SHIPPED | OUTPATIENT
Start: 2024-01-09

## 2024-01-09 RX ORDER — ATORVASTATIN CALCIUM 40 MG/1
40 TABLET, FILM COATED ORAL DAILY
Qty: 90 TABLET | Refills: 1 | Status: SHIPPED | OUTPATIENT
Start: 2024-01-09

## 2024-01-09 ASSESSMENT — PATIENT HEALTH QUESTIONNAIRE - PHQ9
SUM OF ALL RESPONSES TO PHQ QUESTIONS 1-9: 2
2. FEELING DOWN, DEPRESSED OR HOPELESS: 2
SUM OF ALL RESPONSES TO PHQ QUESTIONS 1-9: 2
1. LITTLE INTEREST OR PLEASURE IN DOING THINGS: 0
SUM OF ALL RESPONSES TO PHQ9 QUESTIONS 1 & 2: 2

## 2024-01-09 ASSESSMENT — ENCOUNTER SYMPTOMS: SHORTNESS OF BREATH: 0

## 2024-01-09 NOTE — PROGRESS NOTES
Patient ID: Saida Sorensen 1955    .  Chief Complaint   Patient presents with    Hypertension    Discuss Labs         Hypertension  This is a chronic problem. The current episode started more than 1 year ago. The problem has been rapidly improving since onset. The problem is controlled. Pertinent negatives include no palpitations or shortness of breath. Risk factors for coronary artery disease include post-menopausal state. Past treatments include angiotensin blockers, diuretics and beta blockers. There are no compliance problems.    Hyperlipidemia  This is a chronic problem. The current episode started more than 1 month ago. Pertinent negatives include no shortness of breath. Current antihyperlipidemic treatment includes statins. The current treatment provides significant improvement of lipids. Risk factors for coronary artery disease include hypertension and post-menopausal.         Patient Active Problem List   Diagnosis    Goiter    Hearing loss    Hyperlipidemia    Essential hypertension    High serum renin    Colon polyp    Acute superficial gastritis without hemorrhage    Age-related osteoporosis without current pathological fracture    Hiatal hernia    Tobacco abuse    History of colon polyps       Past Surgical History:   Procedure Laterality Date    BREAST BIOPSY      CATARACT REMOVAL Left     COLONOSCOPY  2012    polyp ([facility])    COLONOSCOPY  09/05/2017    repeat 5 years    HYSTERECTOMY (CERVIX STATUS UNKNOWN)  1990's    complete (fibroids)    OVARY REMOVAL      THYROID SURGERY      nodule    UPPER GASTROINTESTINAL ENDOSCOPY  08/29/2017    ulcer, non bleeding    UPPER GASTROINTESTINAL ENDOSCOPY  12/06/2017       Family History   Problem Relation Age of Onset    Kidney Disease Mother     No Known Problems Father     Diabetes Sister         type 2    Breast Cancer Sister     Heart Disease Sister     No Known Problems Sister     No Known Problems Sister     High Blood Pressure Sister     No Known

## 2024-01-09 NOTE — PATIENT INSTRUCTIONS
BRING YOUR MEDICATION BOTTLES TO ALL APPOINTMENTS    Check MY CHART for test results.  If you have forgotten your password, call [phone].    The diagnoses and medications listed in this after visit summary may not be up to date.  Check MY CHART in 28-48 hours for corrections.      Late cancellation policy: So that we can better accommodate people who are sick, please give our office 24 hour notice for an appointment cancellation.      Missed appointments: Your care is very important to us.  It is important that you keep your scheduled appointments.   Multiple missed appointments will lead to a dismissal from the office.      Patients arriving late will be worked into the schedule as time permits, with patients arriving on time taken as scheduled. Late arriving patients are more than welcome to wait or reschedule their appointments.    Please allow 5-7 business days for paperwork to be processed.                       Pt keeps pulling off her sat monitor. Spot check 95% and pt breathing.

## 2024-04-03 NOTE — TELEPHONE ENCOUNTER
Patient called wanting to know if [employer] brand compared to sudafed would be ok for congestion, told her yes to take. Patient verbalized understanding all questions answered. PA/NP...

## 2024-05-27 DIAGNOSIS — M81.0 AGE-RELATED OSTEOPOROSIS WITHOUT CURRENT PATHOLOGICAL FRACTURE: ICD-10-CM

## 2024-05-28 RX ORDER — ALENDRONATE SODIUM 70 MG/1
70 TABLET ORAL
Qty: 12 TABLET | Refills: 3 | OUTPATIENT
Start: 2024-05-28

## 2024-05-30 ENCOUNTER — TELEPHONE (OUTPATIENT)
Dept: FAMILY MEDICINE CLINIC | Age: 69
End: 2024-05-30

## 2024-06-26 ENCOUNTER — OFFICE VISIT (OUTPATIENT)
Dept: FAMILY MEDICINE CLINIC | Age: 69
End: 2024-06-26
Payer: MEDICARE

## 2024-06-26 VITALS
BODY MASS INDEX: 23.27 KG/M2 | WEIGHT: 144.8 LBS | SYSTOLIC BLOOD PRESSURE: 122 MMHG | HEIGHT: 66 IN | OXYGEN SATURATION: 96 % | HEART RATE: 66 BPM | DIASTOLIC BLOOD PRESSURE: 70 MMHG

## 2024-06-26 DIAGNOSIS — I10 ESSENTIAL HYPERTENSION: Primary | ICD-10-CM

## 2024-06-26 DIAGNOSIS — M81.0 AGE-RELATED OSTEOPOROSIS WITHOUT CURRENT PATHOLOGICAL FRACTURE: ICD-10-CM

## 2024-06-26 DIAGNOSIS — E78.5 HYPERLIPIDEMIA, UNSPECIFIED HYPERLIPIDEMIA TYPE: ICD-10-CM

## 2024-06-26 DIAGNOSIS — R32 URINARY INCONTINENCE, UNSPECIFIED TYPE: ICD-10-CM

## 2024-06-26 PROCEDURE — G8427 DOCREV CUR MEDS BY ELIG CLIN: HCPCS | Performed by: FAMILY MEDICINE

## 2024-06-26 PROCEDURE — G8420 CALC BMI NORM PARAMETERS: HCPCS | Performed by: FAMILY MEDICINE

## 2024-06-26 PROCEDURE — 1090F PRES/ABSN URINE INCON ASSESS: CPT | Performed by: FAMILY MEDICINE

## 2024-06-26 PROCEDURE — 3078F DIAST BP <80 MM HG: CPT | Performed by: FAMILY MEDICINE

## 2024-06-26 PROCEDURE — 3017F COLORECTAL CA SCREEN DOC REV: CPT | Performed by: FAMILY MEDICINE

## 2024-06-26 PROCEDURE — 0509F URINE INCON PLAN DOCD: CPT | Performed by: FAMILY MEDICINE

## 2024-06-26 PROCEDURE — 99214 OFFICE O/P EST MOD 30 MIN: CPT | Performed by: FAMILY MEDICINE

## 2024-06-26 PROCEDURE — G8399 PT W/DXA RESULTS DOCUMENT: HCPCS | Performed by: FAMILY MEDICINE

## 2024-06-26 PROCEDURE — 4004F PT TOBACCO SCREEN RCVD TLK: CPT | Performed by: FAMILY MEDICINE

## 2024-06-26 PROCEDURE — 3074F SYST BP LT 130 MM HG: CPT | Performed by: FAMILY MEDICINE

## 2024-06-26 PROCEDURE — 1123F ACP DISCUSS/DSCN MKR DOCD: CPT | Performed by: FAMILY MEDICINE

## 2024-06-26 RX ORDER — ALENDRONATE SODIUM 10 MG/1
10 TABLET ORAL
Qty: 30 TABLET | Refills: 3 | Status: SHIPPED | OUTPATIENT
Start: 2024-06-26

## 2024-06-26 RX ORDER — SOLIFENACIN SUCCINATE 5 MG/1
5 TABLET, FILM COATED ORAL DAILY
Qty: 90 TABLET | Refills: 1 | Status: SHIPPED | OUTPATIENT
Start: 2024-06-26

## 2024-06-26 RX ORDER — ATENOLOL 100 MG/1
100 TABLET ORAL DAILY
Qty: 90 TABLET | Refills: 1 | Status: SHIPPED | OUTPATIENT
Start: 2024-06-26

## 2024-06-26 RX ORDER — LOSARTAN POTASSIUM 100 MG/1
100 TABLET ORAL DAILY
Qty: 90 TABLET | Refills: 1 | Status: SHIPPED | OUTPATIENT
Start: 2024-06-26

## 2024-06-26 RX ORDER — ATORVASTATIN CALCIUM 40 MG/1
40 TABLET, FILM COATED ORAL DAILY
Qty: 90 TABLET | Refills: 1 | Status: SHIPPED | OUTPATIENT
Start: 2024-06-26

## 2024-06-26 RX ORDER — SPIRONOLACTONE 100 MG/1
100 TABLET, FILM COATED ORAL DAILY
Qty: 90 TABLET | Refills: 3 | Status: SHIPPED | OUTPATIENT
Start: 2024-06-26

## 2024-06-26 ASSESSMENT — ENCOUNTER SYMPTOMS: SHORTNESS OF BREATH: 0

## 2024-06-26 NOTE — PROGRESS NOTES
complete (fibroids)    OVARY REMOVAL      THYROID SURGERY      nodule    UPPER GASTROINTESTINAL ENDOSCOPY  08/29/2017    ulcer, non bleeding    UPPER GASTROINTESTINAL ENDOSCOPY  12/06/2017       Family History   Problem Relation Age of Onset    Kidney Disease Mother     No Known Problems Father     Diabetes Sister         type 2    Breast Cancer Sister     Heart Disease Sister     No Known Problems Sister     No Known Problems Sister     High Blood Pressure Sister     No Known Problems Sister     Lung Cancer Brother         Laryngeal       Current Outpatient Medications on File Prior to Visit   Medication Sig Dispense Refill    Polyethyl Glycol-Propyl Glycol (SYSTANE OP) Apply to eye      Acetaminophen (TYLENOL ARTHRITIS PAIN PO) Take by mouth as needed      [DISCONTINUED] solifenacin (VESICARE) 5 MG tablet Take 1 tablet by mouth daily. 90 tablet 1     No current facility-administered medications on file prior to visit.                   Objective:         Physical Exam  Vitals and nursing note reviewed.   Constitutional:       General: She is not in acute distress.     Appearance: She is well-developed.   HENT:      Head: Normocephalic and atraumatic.   Cardiovascular:      Rate and Rhythm: Normal rate and regular rhythm.      Heart sounds: Normal heart sounds, S1 normal and S2 normal.   Pulmonary:      Effort: Pulmonary effort is normal. No respiratory distress.      Breath sounds: Normal breath sounds. No wheezing.   Musculoskeletal:      Cervical back: Neck supple.   Skin:     General: Skin is warm and dry.   Neurological:      Mental Status: She is alert and oriented to person, place, and time.      Comments: .     Psychiatric:         Attention and Perception: She is attentive.         Speech: Speech normal.         Behavior: Behavior normal.         Thought Content: Thought content normal.      Comments: Seems a little bit stressed today       Vitals:    06/26/24 1045 06/26/24 1053   BP: 130/70 122/70

## 2024-06-26 NOTE — PATIENT INSTRUCTIONS
NEW OFFICE HOURS: M-TH 7AM-5PM  BRING YOUR MEDICATION BOTTLES TO ALL APPOINTMENTS    Check MY CHART for test results.  If you have forgotten your password, call [phone].  The diagnoses and medications listed in this after visit summary may not be up to date.  Check MY CHART in 28-48 hours for corrections.      Late cancellation policy: So that we can better accommodate people who are sick, please give our office 24 hour notice for an appointment cancellation.    Missed appointments: Your care is very important to us.  It is important that you keep your scheduled appointments.   Multiple missed appointments will lead to a dismissal from the office.    Patients arriving late will be worked into the schedule as time permits, with patients arriving on time taken as scheduled. Late arriving patients are more than welcome to wait or reschedule their appointments.    Please allow 5-7 business days for paperwork to be processed.

## 2024-10-09 DIAGNOSIS — M81.0 AGE-RELATED OSTEOPOROSIS WITHOUT CURRENT PATHOLOGICAL FRACTURE: ICD-10-CM

## 2024-10-10 RX ORDER — ALENDRONATE SODIUM 10 MG/1
10 TABLET ORAL
Qty: 30 TABLET | Refills: 11 | OUTPATIENT
Start: 2024-10-10

## 2024-12-23 DIAGNOSIS — E78.5 HYPERLIPIDEMIA, UNSPECIFIED HYPERLIPIDEMIA TYPE: ICD-10-CM

## 2024-12-23 DIAGNOSIS — I10 ESSENTIAL HYPERTENSION: ICD-10-CM

## 2024-12-23 DIAGNOSIS — R32 URINARY INCONTINENCE, UNSPECIFIED TYPE: ICD-10-CM

## 2024-12-30 RX ORDER — ATENOLOL 100 MG/1
100 TABLET ORAL DAILY
Qty: 90 TABLET | Refills: 3 | OUTPATIENT
Start: 2024-12-30

## 2024-12-30 RX ORDER — SOLIFENACIN SUCCINATE 5 MG/1
5 TABLET, FILM COATED ORAL DAILY
Qty: 90 TABLET | Refills: 3 | OUTPATIENT
Start: 2024-12-30

## 2024-12-30 RX ORDER — LOSARTAN POTASSIUM 100 MG/1
100 TABLET ORAL DAILY
Qty: 90 TABLET | Refills: 3 | OUTPATIENT
Start: 2024-12-30

## 2024-12-30 RX ORDER — ATORVASTATIN CALCIUM 40 MG/1
40 TABLET, FILM COATED ORAL DAILY
Qty: 90 TABLET | Refills: 3 | OUTPATIENT
Start: 2024-12-30

## 2025-06-17 ENCOUNTER — COMMUNITY OUTREACH (OUTPATIENT)
Dept: FAMILY MEDICINE CLINIC | Age: 70
End: 2025-06-17